# Patient Record
Sex: MALE | Race: WHITE | NOT HISPANIC OR LATINO | Employment: OTHER | URBAN - NONMETROPOLITAN AREA
[De-identification: names, ages, dates, MRNs, and addresses within clinical notes are randomized per-mention and may not be internally consistent; named-entity substitution may affect disease eponyms.]

---

## 2017-08-29 ENCOUNTER — HOSPITAL ENCOUNTER (EMERGENCY)
Facility: HOSPITAL | Age: 37
Discharge: HOME | End: 2017-08-30
Attending: EMERGENCY MEDICINE | Admitting: EMERGENCY MEDICINE

## 2017-08-29 ENCOUNTER — APPOINTMENT (OUTPATIENT)
Dept: CT IMAGING | Facility: HOSPITAL | Age: 37
End: 2017-08-29

## 2017-08-29 DIAGNOSIS — R30.0 DYSURIA: Primary | ICD-10-CM

## 2017-08-29 LAB
ALBUMIN SERPL-MCNC: 4.4 G/DL (ref 3.5–5)
ALBUMIN/GLOB SERPL: 1.4 G/DL (ref 1.5–2.5)
ALP SERPL-CCNC: 82 U/L (ref 40–129)
ALT SERPL W P-5'-P-CCNC: 22 U/L (ref 10–44)
ANION GAP SERPL CALCULATED.3IONS-SCNC: 6 MMOL/L (ref 3.6–11.2)
AST SERPL-CCNC: 20 U/L (ref 10–34)
BASOPHILS # BLD AUTO: 0.05 10*3/MM3 (ref 0–0.3)
BASOPHILS NFR BLD AUTO: 0.5 % (ref 0–2)
BILIRUB SERPL-MCNC: 0.3 MG/DL (ref 0.2–1.8)
BILIRUB UR QL STRIP: NEGATIVE
BUN BLD-MCNC: 8 MG/DL (ref 7–21)
BUN/CREAT SERPL: 10.1 (ref 7–25)
C TRACH RRNA CVX QL NAA+PROBE: NOT DETECTED
CALCIUM SPEC-SCNC: 9.7 MG/DL (ref 7.7–10)
CHLORIDE SERPL-SCNC: 108 MMOL/L (ref 99–112)
CK SERPL-CCNC: 105 U/L (ref 24–204)
CLARITY UR: CLEAR
CO2 SERPL-SCNC: 27 MMOL/L (ref 24.3–31.9)
COLOR UR: ABNORMAL
CREAT BLD-MCNC: 0.79 MG/DL (ref 0.43–1.29)
D-LACTATE SERPL-SCNC: 1.2 MMOL/L (ref 0.5–2)
DEPRECATED RDW RBC AUTO: 39.6 FL (ref 37–54)
EOSINOPHIL # BLD AUTO: 0.22 10*3/MM3 (ref 0–0.7)
EOSINOPHIL NFR BLD AUTO: 2.2 % (ref 0–5)
ERYTHROCYTE [DISTWIDTH] IN BLOOD BY AUTOMATED COUNT: 12 % (ref 11.5–14.5)
GFR SERPL CREATININE-BSD FRML MDRD: 110 ML/MIN/1.73
GLOBULIN UR ELPH-MCNC: 3.2 GM/DL
GLUCOSE BLD-MCNC: 111 MG/DL (ref 70–110)
GLUCOSE UR STRIP-MCNC: NEGATIVE MG/DL
HCT VFR BLD AUTO: 41 % (ref 42–52)
HGB BLD-MCNC: 14.5 G/DL (ref 14–18)
HGB UR QL STRIP.AUTO: NEGATIVE
IMM GRANULOCYTES # BLD: 0.03 10*3/MM3 (ref 0–0.03)
IMM GRANULOCYTES NFR BLD: 0.3 % (ref 0–0.5)
KETONES UR QL STRIP: NEGATIVE
LEUKOCYTE ESTERASE UR QL STRIP.AUTO: NEGATIVE
LYMPHOCYTES # BLD AUTO: 2.86 10*3/MM3 (ref 1–3)
LYMPHOCYTES NFR BLD AUTO: 28.3 % (ref 21–51)
MAGNESIUM SERPL-MCNC: 1.8 MG/DL (ref 1.7–2.6)
MCH RBC QN AUTO: 32.4 PG (ref 27–33)
MCHC RBC AUTO-ENTMCNC: 35.4 G/DL (ref 33–37)
MCV RBC AUTO: 91.7 FL (ref 80–94)
MONOCYTES # BLD AUTO: 0.47 10*3/MM3 (ref 0.1–0.9)
MONOCYTES NFR BLD AUTO: 4.7 % (ref 0–10)
N GONORRHOEA RRNA SPEC QL NAA+PROBE: NOT DETECTED
NEUTROPHILS # BLD AUTO: 6.47 10*3/MM3 (ref 1.4–6.5)
NEUTROPHILS NFR BLD AUTO: 64 % (ref 30–70)
NITRITE UR QL STRIP: NEGATIVE
OSMOLALITY SERPL CALC.SUM OF ELEC: 280.3 MOSM/KG (ref 273–305)
PH UR STRIP.AUTO: 6.5 [PH] (ref 5–8)
PLATELET # BLD AUTO: 209 10*3/MM3 (ref 130–400)
PMV BLD AUTO: 11.1 FL (ref 6–10)
POTASSIUM BLD-SCNC: 3.8 MMOL/L (ref 3.5–5.3)
PROT SERPL-MCNC: 7.6 G/DL (ref 6–8)
PROT UR QL STRIP: NEGATIVE
RBC # BLD AUTO: 4.47 10*6/MM3 (ref 4.7–6.1)
SODIUM BLD-SCNC: 141 MMOL/L (ref 135–153)
SP GR UR STRIP: 1.03 (ref 1–1.03)
UROBILINOGEN UR QL STRIP: ABNORMAL
WBC NRBC COR # BLD: 10.1 10*3/MM3 (ref 4.5–12.5)

## 2017-08-29 PROCEDURE — 83605 ASSAY OF LACTIC ACID: CPT | Performed by: EMERGENCY MEDICINE

## 2017-08-29 PROCEDURE — 81003 URINALYSIS AUTO W/O SCOPE: CPT | Performed by: EMERGENCY MEDICINE

## 2017-08-29 PROCEDURE — 74176 CT ABD & PELVIS W/O CONTRAST: CPT | Performed by: RADIOLOGY

## 2017-08-29 PROCEDURE — 74176 CT ABD & PELVIS W/O CONTRAST: CPT

## 2017-08-29 PROCEDURE — 96360 HYDRATION IV INFUSION INIT: CPT

## 2017-08-29 PROCEDURE — 83735 ASSAY OF MAGNESIUM: CPT | Performed by: EMERGENCY MEDICINE

## 2017-08-29 PROCEDURE — 87491 CHLMYD TRACH DNA AMP PROBE: CPT | Performed by: EMERGENCY MEDICINE

## 2017-08-29 PROCEDURE — 85025 COMPLETE CBC W/AUTO DIFF WBC: CPT | Performed by: EMERGENCY MEDICINE

## 2017-08-29 PROCEDURE — 87591 N.GONORRHOEAE DNA AMP PROB: CPT | Performed by: EMERGENCY MEDICINE

## 2017-08-29 PROCEDURE — 96361 HYDRATE IV INFUSION ADD-ON: CPT

## 2017-08-29 PROCEDURE — 99284 EMERGENCY DEPT VISIT MOD MDM: CPT

## 2017-08-29 PROCEDURE — 82550 ASSAY OF CK (CPK): CPT | Performed by: EMERGENCY MEDICINE

## 2017-08-29 PROCEDURE — 80053 COMPREHEN METABOLIC PANEL: CPT | Performed by: EMERGENCY MEDICINE

## 2017-08-29 RX ORDER — GABAPENTIN 100 MG/1
100 CAPSULE ORAL 3 TIMES DAILY
COMMUNITY
End: 2022-08-16

## 2017-08-29 RX ORDER — SODIUM CHLORIDE 9 MG/ML
125 INJECTION, SOLUTION INTRAVENOUS CONTINUOUS
Status: DISCONTINUED | OUTPATIENT
Start: 2017-08-29 | End: 2017-08-30 | Stop reason: HOSPADM

## 2017-08-29 RX ORDER — ATORVASTATIN CALCIUM 10 MG/1
10 TABLET, FILM COATED ORAL DAILY
COMMUNITY
End: 2022-08-16

## 2017-08-29 RX ORDER — OMEPRAZOLE 20 MG/1
20 CAPSULE, DELAYED RELEASE ORAL DAILY
COMMUNITY
End: 2022-08-16

## 2017-08-29 RX ORDER — SODIUM CHLORIDE 0.9 % (FLUSH) 0.9 %
10 SYRINGE (ML) INJECTION AS NEEDED
Status: DISCONTINUED | OUTPATIENT
Start: 2017-08-29 | End: 2017-08-30 | Stop reason: HOSPADM

## 2017-08-29 RX ADMIN — SODIUM CHLORIDE 125 ML/HR: 9 INJECTION, SOLUTION INTRAVENOUS at 22:56

## 2017-08-29 RX ADMIN — SODIUM CHLORIDE 1000 ML: 9 INJECTION, SOLUTION INTRAVENOUS at 21:38

## 2017-08-30 VITALS
RESPIRATION RATE: 16 BRPM | HEIGHT: 71 IN | SYSTOLIC BLOOD PRESSURE: 129 MMHG | OXYGEN SATURATION: 98 % | WEIGHT: 220 LBS | BODY MASS INDEX: 30.8 KG/M2 | HEART RATE: 61 BPM | TEMPERATURE: 97.9 F | DIASTOLIC BLOOD PRESSURE: 74 MMHG

## 2017-08-30 RX ORDER — PHENAZOPYRIDINE HYDROCHLORIDE 200 MG/1
200 TABLET, FILM COATED ORAL 3 TIMES DAILY PRN
Qty: 9 TABLET | Refills: 0 | Status: SHIPPED | OUTPATIENT
Start: 2017-08-30 | End: 2017-09-01

## 2018-09-23 ENCOUNTER — APPOINTMENT (OUTPATIENT)
Dept: CT IMAGING | Facility: HOSPITAL | Age: 38
End: 2018-09-23

## 2018-09-23 ENCOUNTER — HOSPITAL ENCOUNTER (EMERGENCY)
Facility: HOSPITAL | Age: 38
Discharge: HOME | End: 2018-09-23
Attending: EMERGENCY MEDICINE | Admitting: EMERGENCY MEDICINE

## 2018-09-23 VITALS
DIASTOLIC BLOOD PRESSURE: 86 MMHG | BODY MASS INDEX: 30.8 KG/M2 | RESPIRATION RATE: 17 BRPM | TEMPERATURE: 98.4 F | WEIGHT: 220 LBS | OXYGEN SATURATION: 97 % | SYSTOLIC BLOOD PRESSURE: 121 MMHG | HEART RATE: 79 BPM | HEIGHT: 71 IN

## 2018-09-23 DIAGNOSIS — R91.8 PULMONARY NODULES: ICD-10-CM

## 2018-09-23 DIAGNOSIS — S20.212A CONTUSION OF LEFT CHEST WALL, INITIAL ENCOUNTER: ICD-10-CM

## 2018-09-23 DIAGNOSIS — S39.012A STRAIN OF LUMBAR REGION, INITIAL ENCOUNTER: Primary | ICD-10-CM

## 2018-09-23 PROCEDURE — 74177 CT ABD & PELVIS W/CONTRAST: CPT

## 2018-09-23 PROCEDURE — 70450 CT HEAD/BRAIN W/O DYE: CPT | Performed by: RADIOLOGY

## 2018-09-23 PROCEDURE — 72128 CT CHEST SPINE W/O DYE: CPT

## 2018-09-23 PROCEDURE — 99282 EMERGENCY DEPT VISIT SF MDM: CPT

## 2018-09-23 PROCEDURE — 72128 CT CHEST SPINE W/O DYE: CPT | Performed by: RADIOLOGY

## 2018-09-23 PROCEDURE — 71260 CT THORAX DX C+: CPT | Performed by: RADIOLOGY

## 2018-09-23 PROCEDURE — 93010 ELECTROCARDIOGRAM REPORT: CPT | Performed by: INTERNAL MEDICINE

## 2018-09-23 PROCEDURE — 93005 ELECTROCARDIOGRAM TRACING: CPT | Performed by: FAMILY MEDICINE

## 2018-09-23 PROCEDURE — 25010000002 IOPAMIDOL 61 % SOLUTION: Performed by: EMERGENCY MEDICINE

## 2018-09-23 PROCEDURE — 70450 CT HEAD/BRAIN W/O DYE: CPT

## 2018-09-23 PROCEDURE — 72125 CT NECK SPINE W/O DYE: CPT | Performed by: RADIOLOGY

## 2018-09-23 PROCEDURE — 72131 CT LUMBAR SPINE W/O DYE: CPT

## 2018-09-23 PROCEDURE — 74177 CT ABD & PELVIS W/CONTRAST: CPT | Performed by: RADIOLOGY

## 2018-09-23 PROCEDURE — 96360 HYDRATION IV INFUSION INIT: CPT

## 2018-09-23 PROCEDURE — 71260 CT THORAX DX C+: CPT

## 2018-09-23 PROCEDURE — 72131 CT LUMBAR SPINE W/O DYE: CPT | Performed by: RADIOLOGY

## 2018-09-23 PROCEDURE — 72125 CT NECK SPINE W/O DYE: CPT

## 2018-09-23 RX ORDER — CYCLOBENZAPRINE HCL 10 MG
10 TABLET ORAL 3 TIMES DAILY PRN
Qty: 15 TABLET | Refills: 0 | Status: SHIPPED | OUTPATIENT
Start: 2018-09-23 | End: 2022-08-16

## 2018-09-23 RX ORDER — HYDROXYZINE PAMOATE 25 MG/1
25 CAPSULE ORAL NIGHTLY
COMMUNITY
End: 2022-08-16

## 2018-09-23 RX ORDER — HYDROCODONE BITARTRATE AND ACETAMINOPHEN 7.5; 325 MG/1; MG/1
1 TABLET ORAL EVERY 6 HOURS PRN
COMMUNITY

## 2018-09-23 RX ORDER — SODIUM CHLORIDE 0.9 % (FLUSH) 0.9 %
10 SYRINGE (ML) INJECTION AS NEEDED
Status: DISCONTINUED | OUTPATIENT
Start: 2018-09-23 | End: 2018-09-23 | Stop reason: HOSPADM

## 2018-09-23 RX ADMIN — IOPAMIDOL 92 ML: 612 INJECTION, SOLUTION INTRAVENOUS at 17:03

## 2018-09-23 RX ADMIN — SODIUM CHLORIDE 1000 ML: 9 INJECTION, SOLUTION INTRAVENOUS at 16:34
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
## 2021-04-05 ENCOUNTER — TRANSCRIBE ORDERS (OUTPATIENT)
Dept: ADMINISTRATIVE | Facility: HOSPITAL | Age: 41
End: 2021-04-05

## 2021-04-05 ENCOUNTER — HOSPITAL ENCOUNTER (OUTPATIENT)
Dept: GENERAL RADIOLOGY | Facility: HOSPITAL | Age: 41
Discharge: HOME | End: 2021-04-05
Admitting: PHYSICIAN ASSISTANT

## 2021-04-05 DIAGNOSIS — M25.511 ACUTE PAIN OF RIGHT SHOULDER: Primary | ICD-10-CM

## 2021-04-05 DIAGNOSIS — M25.511 ACUTE PAIN OF RIGHT SHOULDER: ICD-10-CM

## 2021-04-05 PROCEDURE — 73030 X-RAY EXAM OF SHOULDER: CPT

## 2021-04-05 PROCEDURE — 73030 X-RAY EXAM OF SHOULDER: CPT | Performed by: RADIOLOGY

## 2022-04-04 ENCOUNTER — APPOINTMENT (OUTPATIENT)
Dept: GENERAL RADIOLOGY | Facility: HOSPITAL | Age: 42
End: 2022-04-04

## 2022-04-04 LAB
ALBUMIN SERPL-MCNC: 3.99 G/DL (ref 3.5–5.2)
ALBUMIN/GLOB SERPL: 1.3 G/DL
ALP SERPL-CCNC: 94 U/L (ref 39–117)
ALT SERPL W P-5'-P-CCNC: 18 U/L (ref 1–41)
ANION GAP SERPL CALCULATED.3IONS-SCNC: 9.7 MMOL/L (ref 5–15)
AST SERPL-CCNC: 16 U/L (ref 1–40)
BASOPHILS # BLD AUTO: 0.08 10*3/MM3 (ref 0–0.2)
BASOPHILS NFR BLD AUTO: 0.8 % (ref 0–1.5)
BILIRUB SERPL-MCNC: 0.2 MG/DL (ref 0–1.2)
BUN SERPL-MCNC: 6 MG/DL (ref 6–20)
BUN/CREAT SERPL: 6.5 (ref 7–25)
CALCIUM SPEC-SCNC: 9 MG/DL (ref 8.6–10.5)
CHLORIDE SERPL-SCNC: 101 MMOL/L (ref 98–107)
CO2 SERPL-SCNC: 23.3 MMOL/L (ref 22–29)
CREAT SERPL-MCNC: 0.92 MG/DL (ref 0.76–1.27)
DEPRECATED RDW RBC AUTO: 40 FL (ref 37–54)
EGFRCR SERPLBLD CKD-EPI 2021: 107.2 ML/MIN/1.73
EOSINOPHIL # BLD AUTO: 0.2 10*3/MM3 (ref 0–0.4)
EOSINOPHIL NFR BLD AUTO: 2 % (ref 0.3–6.2)
ERYTHROCYTE [DISTWIDTH] IN BLOOD BY AUTOMATED COUNT: 11.8 % (ref 12.3–15.4)
GLOBULIN UR ELPH-MCNC: 3 GM/DL
GLUCOSE SERPL-MCNC: 133 MG/DL (ref 65–99)
HCT VFR BLD AUTO: 41.6 % (ref 37.5–51)
HGB BLD-MCNC: 14.6 G/DL (ref 13–17.7)
HOLD SPECIMEN: NORMAL
HOLD SPECIMEN: NORMAL
IMM GRANULOCYTES # BLD AUTO: 0.03 10*3/MM3 (ref 0–0.05)
IMM GRANULOCYTES NFR BLD AUTO: 0.3 % (ref 0–0.5)
INR PPP: 0.95 (ref 0.9–1.1)
LYMPHOCYTES # BLD AUTO: 3.03 10*3/MM3 (ref 0.7–3.1)
LYMPHOCYTES NFR BLD AUTO: 29.6 % (ref 19.6–45.3)
MAGNESIUM SERPL-MCNC: 1.7 MG/DL (ref 1.6–2.6)
MCH RBC QN AUTO: 32.2 PG (ref 26.6–33)
MCHC RBC AUTO-ENTMCNC: 35.1 G/DL (ref 31.5–35.7)
MCV RBC AUTO: 91.8 FL (ref 79–97)
MONOCYTES # BLD AUTO: 0.45 10*3/MM3 (ref 0.1–0.9)
MONOCYTES NFR BLD AUTO: 4.4 % (ref 5–12)
NEUTROPHILS NFR BLD AUTO: 6.44 10*3/MM3 (ref 1.7–7)
NEUTROPHILS NFR BLD AUTO: 62.9 % (ref 42.7–76)
NRBC BLD AUTO-RTO: 0 /100 WBC (ref 0–0.2)
PLATELET # BLD AUTO: 212 10*3/MM3 (ref 140–450)
PMV BLD AUTO: 10.3 FL (ref 6–12)
POTASSIUM SERPL-SCNC: 3.8 MMOL/L (ref 3.5–5.2)
PROT SERPL-MCNC: 7 G/DL (ref 6–8.5)
PROTHROMBIN TIME: 12.8 SECONDS (ref 12.1–14.7)
RBC # BLD AUTO: 4.53 10*6/MM3 (ref 4.14–5.8)
SODIUM SERPL-SCNC: 134 MMOL/L (ref 136–145)
TROPONIN T SERPL-MCNC: <0.01 NG/ML (ref 0–0.03)
TSH SERPL DL<=0.05 MIU/L-ACNC: 2.61 UIU/ML (ref 0.27–4.2)
WBC NRBC COR # BLD: 10.23 10*3/MM3 (ref 3.4–10.8)
WHOLE BLOOD HOLD SPECIMEN: NORMAL
WHOLE BLOOD HOLD SPECIMEN: NORMAL

## 2022-04-04 PROCEDURE — 85025 COMPLETE CBC W/AUTO DIFF WBC: CPT | Performed by: PHYSICIAN ASSISTANT

## 2022-04-04 PROCEDURE — 36415 COLL VENOUS BLD VENIPUNCTURE: CPT

## 2022-04-04 PROCEDURE — 84484 ASSAY OF TROPONIN QUANT: CPT | Performed by: PHYSICIAN ASSISTANT

## 2022-04-04 PROCEDURE — 93005 ELECTROCARDIOGRAM TRACING: CPT | Performed by: STUDENT IN AN ORGANIZED HEALTH CARE EDUCATION/TRAINING PROGRAM

## 2022-04-04 PROCEDURE — 93005 ELECTROCARDIOGRAM TRACING: CPT | Performed by: EMERGENCY MEDICINE

## 2022-04-04 PROCEDURE — 71045 X-RAY EXAM CHEST 1 VIEW: CPT

## 2022-04-04 PROCEDURE — 93010 ELECTROCARDIOGRAM REPORT: CPT | Performed by: INTERNAL MEDICINE

## 2022-04-04 PROCEDURE — 93005 ELECTROCARDIOGRAM TRACING: CPT | Performed by: PHYSICIAN ASSISTANT

## 2022-04-04 PROCEDURE — 85610 PROTHROMBIN TIME: CPT | Performed by: PHYSICIAN ASSISTANT

## 2022-04-04 PROCEDURE — 83735 ASSAY OF MAGNESIUM: CPT | Performed by: PHYSICIAN ASSISTANT

## 2022-04-04 PROCEDURE — 84443 ASSAY THYROID STIM HORMONE: CPT | Performed by: PHYSICIAN ASSISTANT

## 2022-04-04 PROCEDURE — 80053 COMPREHEN METABOLIC PANEL: CPT | Performed by: PHYSICIAN ASSISTANT

## 2022-04-04 PROCEDURE — 99284 EMERGENCY DEPT VISIT MOD MDM: CPT

## 2022-04-04 RX ORDER — ASPIRIN 81 MG/1
324 TABLET, CHEWABLE ORAL ONCE
Status: COMPLETED | OUTPATIENT
Start: 2022-04-04 | End: 2022-04-04

## 2022-04-04 RX ADMIN — ASPIRIN 324 MG: 81 TABLET, CHEWABLE ORAL at 21:51

## 2022-04-05 ENCOUNTER — TRANSCRIBE ORDERS (OUTPATIENT)
Dept: ADMINISTRATIVE | Facility: HOSPITAL | Age: 42
End: 2022-04-05

## 2022-04-05 ENCOUNTER — HOSPITAL ENCOUNTER (EMERGENCY)
Facility: HOSPITAL | Age: 42
Discharge: HOME | End: 2022-04-05
Attending: STUDENT IN AN ORGANIZED HEALTH CARE EDUCATION/TRAINING PROGRAM | Admitting: STUDENT IN AN ORGANIZED HEALTH CARE EDUCATION/TRAINING PROGRAM

## 2022-04-05 ENCOUNTER — APPOINTMENT (OUTPATIENT)
Dept: CT IMAGING | Facility: HOSPITAL | Age: 42
End: 2022-04-05

## 2022-04-05 VITALS
DIASTOLIC BLOOD PRESSURE: 66 MMHG | WEIGHT: 230 LBS | OXYGEN SATURATION: 91 % | HEART RATE: 58 BPM | RESPIRATION RATE: 18 BRPM | SYSTOLIC BLOOD PRESSURE: 108 MMHG | HEIGHT: 71 IN | BODY MASS INDEX: 32.2 KG/M2 | TEMPERATURE: 98.3 F

## 2022-04-05 DIAGNOSIS — R55 SYNCOPE AND COLLAPSE: ICD-10-CM

## 2022-04-05 DIAGNOSIS — R07.89 MID STERNAL CHEST PAIN: Primary | ICD-10-CM

## 2022-04-05 DIAGNOSIS — I20.8 ANGINA AT REST: Primary | ICD-10-CM

## 2022-04-05 LAB
QT INTERVAL: 396 MS
QTC INTERVAL: 427 MS
TROPONIN T SERPL-MCNC: <0.01 NG/ML (ref 0–0.03)

## 2022-04-05 PROCEDURE — 84484 ASSAY OF TROPONIN QUANT: CPT | Performed by: PHYSICIAN ASSISTANT

## 2022-04-05 PROCEDURE — 70450 CT HEAD/BRAIN W/O DYE: CPT

## 2022-04-05 RX ORDER — SODIUM CHLORIDE 0.9 % (FLUSH) 0.9 %
10 SYRINGE (ML) INJECTION AS NEEDED
Status: DISCONTINUED | OUTPATIENT
Start: 2022-04-05 | End: 2022-04-05 | Stop reason: HOSPADM

## 2022-04-05 RX ORDER — NITROGLYCERIN 0.4 MG/1
0.4 TABLET SUBLINGUAL
Qty: 45 TABLET | Refills: 0 | Status: SHIPPED | OUTPATIENT
Start: 2022-04-05

## 2022-04-05 RX ADMIN — SODIUM CHLORIDE 500 ML: 9 INJECTION, SOLUTION INTRAVENOUS at 00:50

## 2022-04-05 RX ADMIN — NITROGLYCERIN 0.5 INCH: 20 OINTMENT TOPICAL at 00:50

## 2022-04-05 NOTE — ED PROVIDER NOTES
Subjective     History provided by:  Patient  Chest Pain  Pain location:  Substernal area  Pain quality: aching and throbbing    Pain radiates to:  Does not radiate  Pain severity:  Moderate  Onset quality:  Sudden  Duration:  2 hours  Timing:  Intermittent  Progression:  Waxing and waning  Chronicity:  New  Context: eating (started after eating) and at rest    Relieved by:  Nothing  Associated symptoms: syncope    Associated symptoms: no abdominal pain and no fever    Risk factors: obesity and smoking        Review of Systems   Constitutional: Negative.  Negative for fever.   HENT: Negative.    Respiratory: Negative.    Cardiovascular: Positive for chest pain and syncope.   Gastrointestinal: Negative.  Negative for abdominal pain.   Endocrine: Negative.    Genitourinary: Negative.  Negative for dysuria.   Skin: Negative.    Neurological: Positive for syncope.   Psychiatric/Behavioral: Negative.    All other systems reviewed and are negative.      Past Medical History:   Diagnosis Date   • Anxiety    • Hyperlipidemia    • Neuropathy        Allergies   Allergen Reactions   • Ativan [Lorazepam]    • Penicillins        Past Surgical History:   Procedure Laterality Date   • APPENDECTOMY     • CHOLECYSTECTOMY         No family history on file.    Social History     Socioeconomic History   • Marital status:    Tobacco Use   • Smoking status: Current Every Day Smoker     Packs/day: 2.00   Substance and Sexual Activity   • Alcohol use: No   • Drug use: No   • Sexual activity: Defer           Objective   Physical Exam  Vitals and nursing note reviewed.   Constitutional:       General: He is not in acute distress.     Appearance: He is well-developed. He is not diaphoretic.   HENT:      Head: Normocephalic and atraumatic.      Right Ear: External ear normal.      Left Ear: External ear normal.      Nose: Nose normal.   Eyes:      Conjunctiva/sclera: Conjunctivae normal.   Neck:      Vascular: No JVD.      Trachea: No  tracheal deviation.   Cardiovascular:      Rate and Rhythm: Normal rate and regular rhythm.      Heart sounds: Normal heart sounds. No murmur heard.     Comments: Reproducible mid sternal chest pain   Pulmonary:      Effort: Pulmonary effort is normal. No respiratory distress.      Breath sounds: Normal breath sounds. No wheezing.   Abdominal:      General: Bowel sounds are normal.      Palpations: Abdomen is soft.      Tenderness: There is no abdominal tenderness.   Musculoskeletal:         General: No deformity. Normal range of motion.      Cervical back: Normal range of motion and neck supple.   Skin:     General: Skin is warm and dry.      Coloration: Skin is not pale.      Findings: No erythema or rash.   Neurological:      Mental Status: He is alert and oriented to person, place, and time.      Cranial Nerves: No cranial nerve deficit.   Psychiatric:         Behavior: Behavior normal.         Thought Content: Thought content normal.         Procedures           ED Course  ED Course as of 04/05/22 0206   Mon Apr 04, 2022 2327 ECG 12 Lead  Vent. rate 70 BPM  NE interval 150 ms  QRS duration 84 ms  QT/QTcB 396/427 ms  P-R-T axes 19 56 40  Normal sinus rhythm  Cannot rule out Anterior infarct , age undetermined  Abnormal ECG  When compared with ECG of 23-SEP-2018 16:01,  No significant change was found [ES]   e Apr 05, 2022   0049 CXR rad interpreted:  Findings suggestive of mild CHF/volume overload [RB]   0145 CT head rad interpreted:  Old posttraumatic changes in the head and face. No acute findings identified. [RB]   0204 Outpatient stress test ordered.  [RB]      ED Course User Index  [ES] Branden Rodrigues MD  [RB] Alex Martinez II, PA                                               HEART Score (for prediction of 6-week risk of major adverse cardiac event) reviewed and/or performed as part of the patient evaluation and treatment planning process.  The result associated with this review/performance  is: 2       MDM  Number of Diagnoses or Management Options  Mid sternal chest pain: new and requires workup  Syncope and collapse: new and requires workup     Amount and/or Complexity of Data Reviewed  Clinical lab tests: reviewed and ordered  Tests in the radiology section of CPT®: ordered and reviewed  Tests in the medicine section of CPT®: reviewed  Discuss the patient with other providers: yes    Risk of Complications, Morbidity, and/or Mortality  Presenting problems: moderate  Diagnostic procedures: moderate  Management options: low    Patient Progress  Patient progress: stable      Final diagnoses:   Mid sternal chest pain   Syncope and collapse       ED Disposition  ED Disposition     ED Disposition   Discharge    Condition   Stable    Comment   --             Doanld Awad MD  [address] [facility] [address]  [phone]    Schedule an appointment as soon as possible for a visit       Donnell Barnes MD  [address] [facility] [address]  [phone]    Schedule an appointment as soon as possible for a visit            Medication List      New Prescriptions    nitroglycerin 0.4 MG SL tablet  Commonly known as: NITROSTAT  Place 1 tablet under the tongue Every 5 (Five) Minutes As Needed for Chest Pain. Take no more than 3 doses in 15 minutes.           Where to Get Your Medications      You can get these medications from any pharmacy    Bring a paper prescription for each of these medications  · nitroglycerin 0.4 MG SL tablet          Alex Martinez II, PA  04/05/22 0201

## 2022-04-05 NOTE — ED NOTES
MEDICAL SCREENING:    Reason for Visit: chest pain    Patient initially seen in triage.  The patient was advised further evaluation and diagnostic testing will be needed, some of the treatment and testing will be initiated in the lobby in order to begin the process.  The patient will be returned to the waiting area for the time being and possibly be re-assessed by a subsequent ED provider.  The patient will be brought back to the treatment area in as timely manner as possible.         Tanisha Hartmann PA-C  04/04/22 214

## 2022-04-07 ENCOUNTER — TRANSCRIBE ORDERS (OUTPATIENT)
Dept: ADMINISTRATIVE | Facility: HOSPITAL | Age: 42
End: 2022-04-07

## 2022-04-07 DIAGNOSIS — R07.2 PRECORDIAL PAIN: Primary | ICD-10-CM

## 2022-05-03 ENCOUNTER — HOSPITAL ENCOUNTER (OUTPATIENT)
Dept: NUCLEAR MEDICINE | Facility: HOSPITAL | Age: 42
Discharge: HOME | End: 2022-05-03

## 2022-05-03 ENCOUNTER — HOSPITAL ENCOUNTER (OUTPATIENT)
Dept: CARDIOLOGY | Facility: HOSPITAL | Age: 42
Discharge: HOME | End: 2022-05-03

## 2022-05-03 DIAGNOSIS — I20.8 ANGINA AT REST: ICD-10-CM

## 2022-05-03 DIAGNOSIS — R07.2 PRECORDIAL PAIN: ICD-10-CM

## 2022-05-03 LAB
BH CV ECHO MEAS - AO ROOT DIAM: 3.2 CM
BH CV ECHO MEAS - EDV(CUBED): 74.1 ML
BH CV ECHO MEAS - EDV(MOD-SP4): 64.8 ML
BH CV ECHO MEAS - EF(MOD-SP4): 61.7 %
BH CV ECHO MEAS - ESV(CUBED): 32.8 ML
BH CV ECHO MEAS - ESV(MOD-SP4): 24.8 ML
BH CV ECHO MEAS - FS: 23.8 %
BH CV ECHO MEAS - IVS/LVPW: 1.07 CM
BH CV ECHO MEAS - IVSD: 1.6 CM
BH CV ECHO MEAS - LA DIMENSION: 3.3 CM
BH CV ECHO MEAS - LAT PEAK E' VEL: 13.3 CM/SEC
BH CV ECHO MEAS - LV DIASTOLIC VOL/BSA (35-75): 29 CM2
BH CV ECHO MEAS - LV MASS(C)D: 262.6 GRAMS
BH CV ECHO MEAS - LV SYSTOLIC VOL/BSA (12-30): 11.1 CM2
BH CV ECHO MEAS - LVIDD: 4.2 CM
BH CV ECHO MEAS - LVIDS: 3.2 CM
BH CV ECHO MEAS - LVOT AREA: 5.3 CM2
BH CV ECHO MEAS - LVOT DIAM: 2.6 CM
BH CV ECHO MEAS - LVPWD: 1.5 CM
BH CV ECHO MEAS - MED PEAK E' VEL: 10.1 CM/SEC
BH CV ECHO MEAS - MV A MAX VEL: 56.1 CM/SEC
BH CV ECHO MEAS - MV E MAX VEL: 106 CM/SEC
BH CV ECHO MEAS - MV E/A: 1.89
BH CV ECHO MEAS - PA ACC TIME: 0.12 SEC
BH CV ECHO MEAS - PA PR(ACCEL): 26.8 MMHG
BH CV ECHO MEAS - RAP SYSTOLE: 10 MMHG
BH CV ECHO MEAS - RVSP: 34.2 MMHG
BH CV ECHO MEAS - SI(MOD-SP4): 17.9 ML/M2
BH CV ECHO MEAS - SV(MOD-SP4): 40 ML
BH CV ECHO MEAS - TAPSE (>1.6): 3 CM
BH CV ECHO MEAS - TR MAX PG: 24.2 MMHG
BH CV ECHO MEAS - TR MAX VEL: 246 CM/SEC
BH CV ECHO MEASUREMENTS AVERAGE E/E' RATIO: 9.06
LEFT ATRIUM VOLUME INDEX: 19.2 ML/M2
MAXIMAL PREDICTED HEART RATE: 179 BPM
STRESS TARGET HR: 152 BPM

## 2022-05-03 PROCEDURE — A9500 TC99M SESTAMIBI: HCPCS | Performed by: STUDENT IN AN ORGANIZED HEALTH CARE EDUCATION/TRAINING PROGRAM

## 2022-05-03 PROCEDURE — 93018 CV STRESS TEST I&R ONLY: CPT | Performed by: INTERNAL MEDICINE

## 2022-05-03 PROCEDURE — 93306 TTE W/DOPPLER COMPLETE: CPT | Performed by: SPECIALIST

## 2022-05-03 PROCEDURE — 0 TECHNETIUM SESTAMIBI: Performed by: STUDENT IN AN ORGANIZED HEALTH CARE EDUCATION/TRAINING PROGRAM

## 2022-05-03 PROCEDURE — 78452 HT MUSCLE IMAGE SPECT MULT: CPT | Performed by: INTERNAL MEDICINE

## 2022-05-03 PROCEDURE — 93017 CV STRESS TEST TRACING ONLY: CPT

## 2022-05-03 PROCEDURE — 93306 TTE W/DOPPLER COMPLETE: CPT

## 2022-05-03 PROCEDURE — 78452 HT MUSCLE IMAGE SPECT MULT: CPT

## 2022-05-03 RX ADMIN — TECHNETIUM TC 99M SESTAMIBI 1 DOSE: 1 INJECTION INTRAVENOUS at 10:45

## 2022-05-04 PROCEDURE — 25010000002 REGADENOSON 0.4 MG/5ML SOLUTION: Performed by: STUDENT IN AN ORGANIZED HEALTH CARE EDUCATION/TRAINING PROGRAM

## 2022-05-04 PROCEDURE — 0 TECHNETIUM SESTAMIBI: Performed by: STUDENT IN AN ORGANIZED HEALTH CARE EDUCATION/TRAINING PROGRAM

## 2022-05-04 PROCEDURE — A9500 TC99M SESTAMIBI: HCPCS | Performed by: STUDENT IN AN ORGANIZED HEALTH CARE EDUCATION/TRAINING PROGRAM

## 2022-05-04 RX ADMIN — TECHNETIUM TC 99M SESTAMIBI 1 DOSE: 1 INJECTION INTRAVENOUS at 08:55

## 2022-05-04 RX ADMIN — REGADENOSON 0.4 MG: 0.08 INJECTION, SOLUTION INTRAVENOUS at 08:55

## 2022-05-09 LAB
BH CV NUCLEAR PRIOR STUDY: 3
BH CV REST NUCLEAR ISOTOPE DOSE: 9.6 MCI
BH CV STRESS BP STAGE 1: NORMAL
BH CV STRESS BP STAGE 2: NORMAL
BH CV STRESS COMMENTS STAGE 1: NORMAL
BH CV STRESS COMMENTS STAGE 2: NORMAL
BH CV STRESS DOSE REGADENOSON STAGE 1: 0.4
BH CV STRESS DURATION MIN STAGE 1: 0
BH CV STRESS DURATION MIN STAGE 2: 4
BH CV STRESS DURATION SEC STAGE 1: 10
BH CV STRESS DURATION SEC STAGE 2: 0
BH CV STRESS HR STAGE 1: 82
BH CV STRESS HR STAGE 2: 83
BH CV STRESS NUCLEAR ISOTOPE DOSE: 32.1 MCI
BH CV STRESS PROTOCOL 1: NORMAL
BH CV STRESS RECOVERY BP: NORMAL MMHG
BH CV STRESS RECOVERY HR: 83 BPM
BH CV STRESS STAGE 1: 1
BH CV STRESS STAGE 2: 2
LV EF NUC BP: 64 %
MAXIMAL PREDICTED HEART RATE: 179 BPM
PERCENT MAX PREDICTED HR: 46.37 %
STRESS BASELINE BP: NORMAL MMHG
STRESS BASELINE HR: 70 BPM
STRESS PERCENT HR: 55 %
STRESS POST PEAK BP: NORMAL MMHG
STRESS POST PEAK HR: 83 BPM
STRESS TARGET HR: 152 BPM

## 2022-06-13 PROBLEM — R07.9 CHEST PAIN: Status: ACTIVE | Noted: 2022-06-13

## 2022-08-09 PROBLEM — Z72.0 TOBACCO USE: Status: ACTIVE | Noted: 2022-08-09

## 2022-08-11 NOTE — PROGRESS NOTES
[facility] at [facility]  Cardiology Consultation Note     Shoaib Eng  1980  Requesting Provider: Donald Awad MD  PCP: Donald Awad MD    ID:  Shoaib Eng is a 42 y.o. male who resides in [place], [place]    REASON FOR CONSULTATION:    • Chest pain  • Syncope         Dear Dr Awad:    Thank you for sending Shoaib Eng to my office today for evaluation of chest pain.  He is a 42-year-old female with no known cardiac disease who in April had a syncopal episode while defecating.  When he awoke, he had severe chest pain symptoms.  He presented to [facility] emergency room where he ruled out for myocardial infarction.  CT chest showed no pulmonary embolism.  He was discharged home with orders for a stress test.  A nuclear stress test was then performed for 5/9/2022, which was normal.    The patient continues to have episodes of substernal chest discomfort which she describes as pressure with radiation to his arm.  The symptoms occur sporadically, with no provoking factors such as exertion.  The symptoms typically last 15 minutes and resolve spontaneously.  He is not taking sublingual nitroglycerin for these episodes.  While one episode of chest pain did occur after eating, most episodes are not associated with food.    The patient has concerns of CAD due to his family history.  His father had coronary artery disease requiring PCI in his 40s.  He has several other second-degree relatives with precocious CAD.    The patient historically is a 2 pack-a-day smoker, but is cutting down.      Past Medical History, Past Surgical History, Family history, Social History, and Medications were all reviewed with the patient today and updated as necessary.       Current Outpatient Medications:   •  ALPRAZolam (XANAX) 0.25 MG tablet, Take 1 tablet by mouth Daily., Disp: , Rfl:   •  gabapentin (NEURONTIN) 600 MG tablet, Take  by mouth 3 (Three) Times a Day., Disp: , Rfl:   •  " HYDROcodone-acetaminophen (NORCO) 7.5-325 MG per tablet, Take 1 tablet by mouth Every 6 (Six) Hours As Needed for Moderate Pain ., Disp: , Rfl:   •  nitroglycerin (NITROSTAT) 0.4 MG SL tablet, Place 1 tablet under the tongue Every 5 (Five) Minutes As Needed for Chest Pain. Take no more than 3 doses in 15 minutes., Disp: 45 tablet, Rfl: 0  •  nicotine (Nicoderm CQ) 14 MG/24HR patch, Place 1 patch on the skin as directed by provider Daily., Disp: 30 patch, Rfl: 3    Allergies   Allergen Reactions   • Hurricaine-Dry Handle [Benzocaine] Other (See Comments)     Stops breathing and turns gray   • Ativan [Lorazepam]    • Penicillins          Past Medical History:   Diagnosis Date   • Anxiety    • Family history of stress    • History of echocardiogram    • Hyperlipemia    • Hyperlipidemia    • Neuropathy        Past Surgical History:   Procedure Laterality Date   • APPENDECTOMY     • CHOLECYSTECTOMY         Family History   Problem Relation Age of Onset   • Heart disease Father    • Heart attack Father        Social History     Tobacco Use   • Smoking status: Current Every Day Smoker     Packs/day: 2.00   • Smokeless tobacco: Never Used   Substance Use Topics   • Alcohol use: No       Review of Systems   Constitutional: Positive for malaise/fatigue.   Eyes: Negative for vision loss in left eye and vision loss in right eye.   Cardiovascular: Positive for chest pain and syncope. Negative for dyspnea on exertion, near-syncope, orthopnea, palpitations and paroxysmal nocturnal dyspnea.   Musculoskeletal: Negative for myalgias.   Neurological: Negative for brief paralysis, excessive daytime sleepiness, focal weakness, numbness, paresthesias and weakness.   All other systems reviewed and are negative.              /70 (BP Location: Right arm, Patient Position: Sitting)   Pulse 68   Ht 180.3 cm (71\")   Wt 107 kg (236 lb)   SpO2 97%   BMI 32.92 kg/m²        Constitutional:       Appearance: Healthy appearance. " Well-developed.   Eyes:      General: Lids are normal. No scleral icterus.     Conjunctiva/sclera: Conjunctivae normal.   HENT:      Head: Normocephalic and atraumatic.   Neck:      Thyroid: No thyromegaly.      Vascular: No carotid bruit or JVD.   Pulmonary:      Effort: Pulmonary effort is normal.      Breath sounds: Normal breath sounds. No wheezing. No rhonchi. No rales.   Cardiovascular:      Normal rate. Regular rhythm.      Murmurs: There is no murmur.      No gallop. No rub.   Pulses:     Intact distal pulses.   Abdominal:      General: There is no distension.      Palpations: Abdomen is soft. There is no abdominal mass.   Musculoskeletal:      Cervical back: Normal range of motion. Skin:     General: Skin is warm and dry.      Findings: No rash.   Neurological:      General: No focal deficit present.      Mental Status: Alert and oriented to person, place, and time.      Gait: Gait is intact.   Psychiatric:         Attention and Perception: Attention normal.         Mood and Affect: Mood normal.         Behavior: Behavior normal.             ECG 12 Lead    Date/Time: 8/16/2022 12:48 PM  Performed by: Walter Aj IV, MD  Authorized by: Walter Aj IV, MD   Comparison: compared with previous ECG from 4/4/2022  Similar to previous ECG  Rhythm: sinus rhythm  BPM: 65    Clinical impression: normal ECG          I personally reviewed echo images dated 5/3/2022.  Normal echo.    Lab Results   Component Value Date    GLUCOSE 133 (H) 04/04/2022    BUN 6 04/04/2022    CREATININE 0.92 04/04/2022    BCR 6.5 (L) 04/04/2022    K 3.8 04/04/2022    CO2 23.3 04/04/2022    CALCIUM 9.0 04/04/2022    ALBUMIN 3.99 04/04/2022    AST 16 04/04/2022    ALT 18 04/04/2022     Lab Results   Component Value Date    WBC 10.23 04/04/2022    HGB 14.6 04/04/2022    HCT 41.6 04/04/2022    MCV 91.8 04/04/2022     04/04/2022     Lab Results   Component Value Date    TSH 2.610 04/04/2022      Lab date:  02/08/2022  • FLP: , , HDL 22,           Problem List Items Addressed This Visit        Cardiology Problems    Atypical angina (HCC) - Primary    Overview     · Echocardiogram (5/3/2022): LVEF 65%. Moderate concentric hypertrophy.  · Nuclear stress test (5/4/2022): No evidence of ischemia. LVEF 64%.          Current Assessment & Plan     · Persistent chest pain symptoms with typical and atypical features  · Patient with strong family history of precocious CAD and concerns of occult CAD  · Recommend CT coronary angiogram  · If normal, pursue noncardiac etiologies for symptoms         Relevant Orders    CT Angiogram Coronary    LDL Cholesterol, Direct    Comprehensive Metabolic Panel    Lipid Panel       Other    Situational syncope    Overview     · Episode of syncope after defecation, 4/2022  · Echo (5/3/2022): LVEF 62%.  Normal valves.         Current Assessment & Plan     · Defecation syncope, vasovagal variant  · Structurally normal heart  · Discussed pathophysiology of vasovagal syncope with patient         Tobacco use    Current Assessment & Plan     · Smoking cessation recommended         Relevant Medications    nicotine (Nicoderm CQ) 14 MG/24HR patch                   · CT coronary angiogram  · Repeat CMP, lipids  · Further recommendations to follow            SOCORRO Aj MD, FACC, [facility]  Interventional Cardiology  08/16/22  12:51 EDT

## 2022-08-16 ENCOUNTER — OFFICE VISIT (OUTPATIENT)
Dept: CARDIOLOGY | Facility: CLINIC | Age: 42
End: 2022-08-16

## 2022-08-16 VITALS
WEIGHT: 236 LBS | HEIGHT: 71 IN | HEART RATE: 68 BPM | OXYGEN SATURATION: 97 % | BODY MASS INDEX: 33.04 KG/M2 | SYSTOLIC BLOOD PRESSURE: 118 MMHG | DIASTOLIC BLOOD PRESSURE: 70 MMHG

## 2022-08-16 DIAGNOSIS — I20.8 ATYPICAL ANGINA: Primary | ICD-10-CM

## 2022-08-16 DIAGNOSIS — Z72.0 TOBACCO USE: ICD-10-CM

## 2022-08-16 DIAGNOSIS — R55 SITUATIONAL SYNCOPE: ICD-10-CM

## 2022-08-16 PROBLEM — R21 RASH: Status: ACTIVE | Noted: 2022-08-16

## 2022-08-16 PROBLEM — R05.9 COUGH: Status: ACTIVE | Noted: 2022-08-16

## 2022-08-16 PROBLEM — J06.9 UPPER RESPIRATORY INFECTION: Status: RESOLVED | Noted: 2022-08-16 | Resolved: 2022-08-16

## 2022-08-16 PROBLEM — M54.50 LOW BACK PAIN: Status: ACTIVE | Noted: 2022-08-16

## 2022-08-16 PROBLEM — J06.9 UPPER RESPIRATORY INFECTION: Status: ACTIVE | Noted: 2022-08-16

## 2022-08-16 PROBLEM — K62.5 BLEEDING PER RECTUM: Status: RESOLVED | Noted: 2022-08-16 | Resolved: 2022-08-16

## 2022-08-16 PROBLEM — R21 RASH: Status: RESOLVED | Noted: 2022-08-16 | Resolved: 2022-08-16

## 2022-08-16 PROBLEM — R05.9 COUGH: Status: RESOLVED | Noted: 2022-08-16 | Resolved: 2022-08-16

## 2022-08-16 PROBLEM — K62.5 BLEEDING PER RECTUM: Status: ACTIVE | Noted: 2022-08-16

## 2022-08-16 PROBLEM — K21.9 ACID REFLUX: Status: ACTIVE | Noted: 2022-08-16

## 2022-08-16 PROBLEM — I20.89 ATYPICAL ANGINA: Status: ACTIVE | Noted: 2022-06-13

## 2022-08-16 PROBLEM — K40.90 HERNIA, INGUINAL: Status: ACTIVE | Noted: 2022-08-16

## 2022-08-16 PROCEDURE — 99204 OFFICE O/P NEW MOD 45 MIN: CPT | Performed by: INTERNAL MEDICINE

## 2022-08-16 PROCEDURE — 93000 ELECTROCARDIOGRAM COMPLETE: CPT | Performed by: INTERNAL MEDICINE

## 2022-08-16 RX ORDER — BUTALBITAL, ACETAMINOPHEN AND CAFFEINE 300; 40; 50 MG/1; MG/1; MG/1
CAPSULE ORAL
COMMUNITY
Start: 2014-09-30 | End: 2022-08-16

## 2022-08-16 RX ORDER — LEVOTHYROXINE AND LIOTHYRONINE 19; 4.5 UG/1; UG/1
TABLET ORAL
COMMUNITY
Start: 2014-09-30 | End: 2022-08-16

## 2022-08-16 RX ORDER — BUPROPION HYDROCHLORIDE 300 MG/1
TABLET ORAL
COMMUNITY
Start: 2022-04-07 | End: 2022-08-16

## 2022-08-16 RX ORDER — OMEGA-3-ACID ETHYL ESTERS 1 G/1
CAPSULE, LIQUID FILLED ORAL
COMMUNITY
Start: 2014-09-30 | End: 2022-08-16

## 2022-08-16 RX ORDER — ALPRAZOLAM 0.25 MG/1
1 TABLET ORAL DAILY
COMMUNITY
Start: 2022-04-07

## 2022-08-16 RX ORDER — METOPROLOL SUCCINATE 25 MG/1
TABLET, EXTENDED RELEASE ORAL
COMMUNITY
Start: 2014-09-30 | End: 2022-08-16

## 2022-08-16 RX ORDER — NICOTINE 21 MG/24HR
1 PATCH, TRANSDERMAL 24 HOURS TRANSDERMAL EVERY 24 HOURS
Qty: 30 PATCH | Refills: 3 | Status: SHIPPED | OUTPATIENT
Start: 2022-08-16

## 2022-08-16 RX ORDER — GABAPENTIN 800 MG/1
TABLET ORAL
COMMUNITY
Start: 2014-09-30 | End: 2022-08-16

## 2022-08-16 RX ORDER — GEMFIBROZIL 600 MG/1
TABLET, FILM COATED ORAL
COMMUNITY
Start: 2014-09-30 | End: 2022-08-16

## 2022-08-16 RX ORDER — CYCLOBENZAPRINE HCL 5 MG
TABLET ORAL
COMMUNITY
Start: 2014-09-30 | End: 2022-08-16

## 2022-08-16 RX ORDER — NITROGLYCERIN 0.4 MG/1
TABLET SUBLINGUAL
COMMUNITY
Start: 2022-04-07 | End: 2022-08-16

## 2022-08-16 RX ORDER — GABAPENTIN 600 MG/1
TABLET ORAL 3 TIMES DAILY
COMMUNITY
Start: 2022-04-07

## 2022-08-16 NOTE — ASSESSMENT & PLAN NOTE
· Persistent chest pain symptoms with typical and atypical features  · Patient with strong family history of precocious CAD and concerns of occult CAD  · Recommend CT coronary angiogram  · If normal, pursue noncardiac etiologies for symptoms

## 2022-08-16 NOTE — ASSESSMENT & PLAN NOTE
· Defecation syncope, vasovagal variant  · Structurally normal heart  · Discussed pathophysiology of vasovagal syncope with patient

## 2022-10-07 ENCOUNTER — LAB (OUTPATIENT)
Dept: LAB | Facility: HOSPITAL | Age: 42
End: 2022-10-07

## 2022-10-07 ENCOUNTER — HOSPITAL ENCOUNTER (OUTPATIENT)
Dept: CT IMAGING | Facility: HOSPITAL | Age: 42
Discharge: HOME | End: 2022-10-07

## 2022-10-07 VITALS
RESPIRATION RATE: 16 BRPM | TEMPERATURE: 98 F | SYSTOLIC BLOOD PRESSURE: 121 MMHG | BODY MASS INDEX: 32.93 KG/M2 | DIASTOLIC BLOOD PRESSURE: 78 MMHG | HEART RATE: 67 BPM | WEIGHT: 230 LBS | HEIGHT: 70 IN | OXYGEN SATURATION: 97 %

## 2022-10-07 DIAGNOSIS — I20.8 ANGINA DECUBITUS: Primary | ICD-10-CM

## 2022-10-07 DIAGNOSIS — I20.8 ATYPICAL ANGINA: ICD-10-CM

## 2022-10-07 LAB
ALBUMIN SERPL-MCNC: 3.9 G/DL (ref 3.5–5.2)
ALBUMIN/GLOB SERPL: 1.7 G/DL
ALP SERPL-CCNC: 84 U/L (ref 39–117)
ALT SERPL W P-5'-P-CCNC: 24 U/L (ref 1–41)
ANION GAP SERPL CALCULATED.3IONS-SCNC: 10.3 MMOL/L (ref 5–15)
AST SERPL-CCNC: 16 U/L (ref 1–40)
BILIRUB SERPL-MCNC: 0.4 MG/DL (ref 0–1.2)
BUN SERPL-MCNC: 7 MG/DL (ref 6–20)
BUN/CREAT SERPL: 9.2 (ref 7–25)
CALCIUM SPEC-SCNC: 8.9 MG/DL (ref 8.6–10.5)
CHLORIDE SERPL-SCNC: 103 MMOL/L (ref 98–107)
CHOLEST SERPL-MCNC: 175 MG/DL (ref 0–200)
CO2 SERPL-SCNC: 23.7 MMOL/L (ref 22–29)
CREAT SERPL-MCNC: 0.76 MG/DL (ref 0.76–1.27)
EGFRCR SERPLBLD CKD-EPI 2021: 115.1 ML/MIN/1.73
GLOBULIN UR ELPH-MCNC: 2.3 GM/DL
GLUCOSE SERPL-MCNC: 85 MG/DL (ref 65–99)
HDLC SERPL-MCNC: 23 MG/DL (ref 40–60)
LDLC SERPL CALC-MCNC: 119 MG/DL (ref 0–100)
LDLC/HDLC SERPL: 5.01 {RATIO}
POTASSIUM SERPL-SCNC: 4.3 MMOL/L (ref 3.5–5.2)
PROT SERPL-MCNC: 6.2 G/DL (ref 6–8.5)
SODIUM SERPL-SCNC: 137 MMOL/L (ref 136–145)
TRIGL SERPL-MCNC: 184 MG/DL (ref 0–150)
VLDLC SERPL-MCNC: 33 MG/DL (ref 5–40)

## 2022-10-07 PROCEDURE — 63710000001 NITROGLYCERIN 0.4 MG SUBLINGUAL TABLET

## 2022-10-07 PROCEDURE — 0 IOPAMIDOL PER 1 ML: Performed by: INTERNAL MEDICINE

## 2022-10-07 PROCEDURE — 80053 COMPREHEN METABOLIC PANEL: CPT

## 2022-10-07 PROCEDURE — 80061 LIPID PANEL: CPT

## 2022-10-07 PROCEDURE — A9270 NON-COVERED ITEM OR SERVICE: HCPCS

## 2022-10-07 PROCEDURE — 36415 COLL VENOUS BLD VENIPUNCTURE: CPT

## 2022-10-07 PROCEDURE — 75574 CT ANGIO HRT W/3D IMAGE: CPT

## 2022-10-07 RX ORDER — NITROGLYCERIN 0.4 MG/1
0.4 TABLET SUBLINGUAL
Status: COMPLETED | OUTPATIENT
Start: 2022-10-07 | End: 2022-10-07

## 2022-10-07 RX ORDER — NITROGLYCERIN 0.4 MG/1
TABLET SUBLINGUAL
Status: COMPLETED
Start: 2022-10-07 | End: 2022-10-07

## 2022-10-07 RX ORDER — LIDOCAINE HYDROCHLORIDE 10 MG/ML
5 INJECTION, SOLUTION EPIDURAL; INFILTRATION; INTRACAUDAL; PERINEURAL AS NEEDED
Status: DISCONTINUED | OUTPATIENT
Start: 2022-10-07 | End: 2022-10-08 | Stop reason: HOSPADM

## 2022-10-07 RX ORDER — SODIUM CHLORIDE 0.9 % (FLUSH) 0.9 %
10 SYRINGE (ML) INJECTION AS NEEDED
Status: DISCONTINUED | OUTPATIENT
Start: 2022-10-07 | End: 2022-10-08 | Stop reason: HOSPADM

## 2022-10-07 RX ORDER — SODIUM CHLORIDE 0.9 % (FLUSH) 0.9 %
3 SYRINGE (ML) INJECTION EVERY 12 HOURS SCHEDULED
Status: DISCONTINUED | OUTPATIENT
Start: 2022-10-07 | End: 2022-10-08 | Stop reason: HOSPADM

## 2022-10-07 RX ORDER — METOPROLOL TARTRATE 50 MG/1
100 TABLET, FILM COATED ORAL ONCE AS NEEDED
Status: DISCONTINUED | OUTPATIENT
Start: 2022-10-07 | End: 2022-10-08 | Stop reason: HOSPADM

## 2022-10-07 RX ORDER — METOPROLOL TARTRATE 50 MG/1
50 TABLET, FILM COATED ORAL ONCE AS NEEDED
Status: DISCONTINUED | OUTPATIENT
Start: 2022-10-07 | End: 2022-10-08 | Stop reason: HOSPADM

## 2022-10-07 RX ADMIN — NITROGLYCERIN 0.4 MG: 0.4 TABLET SUBLINGUAL at 10:55

## 2022-10-07 RX ADMIN — IOPAMIDOL 65 ML: 755 INJECTION, SOLUTION INTRAVENOUS at 11:08

## 2022-10-10 PROBLEM — I25.709 CORONARY ARTERY DISEASE INVOLVING CORONARY BYPASS GRAFT OF NATIVE HEART WITH ANGINA PECTORIS (HCC): Status: ACTIVE | Noted: 2022-06-13

## 2022-10-11 ENCOUNTER — TELEPHONE (OUTPATIENT)
Dept: CARDIOLOGY | Facility: CLINIC | Age: 42
End: 2022-10-11

## 2022-10-11 DIAGNOSIS — R93.1 ABNORMAL CT SCAN OF HEART: ICD-10-CM

## 2022-10-11 DIAGNOSIS — I25.10 CAD IN NATIVE ARTERY: Primary | ICD-10-CM

## 2022-10-11 DIAGNOSIS — R07.89 CHEST PAIN, ATYPICAL: ICD-10-CM

## 2022-10-11 NOTE — TELEPHONE ENCOUNTER
"----- Message from Walter Aj IV, MD sent at 10/11/2022  9:46 AM EDT -----  Please let the patient know that his CT coronary angiogram has some abnormalities to it.  While it thinks may be \"okay\", I think we should perform a diagnostic coronary angiogram to exclude the possibility of an occluded artery.    LM for pt to return call.  "

## 2022-10-11 NOTE — TELEPHONE ENCOUNTER
----- Message from Dionisio Benavides sent at 10/10/2022  5:28 PM EDT -----  Regarding: RE: cardiac catheterization  I am out of office tomorrow, Tuesday 10/11/22 but will be back bright and EARLY Wed 10/12/22.    I have a spot saved for Mr Eng per HTR's note below.  If one of you could put this order in, I will call him first thing Wed morning and get it taken care of.      Thank you so much      Priscila  ----- Message -----  From: Walter Aj IV, MD  Sent: 10/10/2022   9:45 AM EDT  To: Michelle Valles RN, Bettina Vallejo RN, #  Subject: cardiac catheterization                          CT coronary angiogram shows a possible chronic occlusion of the left circumflex and moderate disease of the LAD.  I recommend the patient undergo cardiac catheterization.  This can be performed at [facility] on the day that on there.  I have not talked with patient yet.    SOCORRO Aj MD [facility], [facility]  Interventional and General Cardiology

## 2022-10-11 NOTE — PROGRESS NOTES
"Please let the patient know that his CT coronary angiogram has some abnormalities to it.  While it thinks may be \"okay\", I think we should perform a diagnostic coronary angiogram to exclude the possibility of an occluded artery."

## 2022-10-11 NOTE — TELEPHONE ENCOUNTER
----- Message from Walter Aj IV, MD sent at 10/10/2022  9:43 AM EDT -----  Regarding: cardiac catheterization  CT coronary angiogram shows a possible chronic occlusion of the left circumflex and moderate disease of the LAD.  I recommend the patient undergo cardiac catheterization.  This can be performed at [facility] on the day that on there.  I have not talked with patient yet.    SOCORRO Aj MD [facility], [facility]  Interventional and General Cardiology

## 2022-10-11 NOTE — TELEPHONE ENCOUNTER
Reviewed results with pt. He is agreeable to cath. Order placed. He has asked to be sure to leave a message if he doesn't answer. He does have a family  to attend Wednesday morning. He will come to either [place] or [place], whichever he can get in soonest.

## 2022-10-12 ENCOUNTER — PREP FOR SURGERY (OUTPATIENT)
Dept: OTHER | Facility: HOSPITAL | Age: 42
End: 2022-10-12

## 2022-10-12 DIAGNOSIS — I25.10 CORONARY ARTERY DISEASE: Primary | ICD-10-CM

## 2022-10-12 PROBLEM — R07.89 CHEST PAIN, ATYPICAL: Status: ACTIVE | Noted: 2022-10-12

## 2022-10-12 PROBLEM — R93.1 ABNORMAL CT SCAN OF HEART: Status: ACTIVE | Noted: 2022-10-12

## 2022-10-12 RX ORDER — ASPIRIN 81 MG/1
81 TABLET ORAL DAILY
Status: CANCELLED | OUTPATIENT
Start: 2022-10-13

## 2022-10-12 RX ORDER — SODIUM CHLORIDE 0.9 % (FLUSH) 0.9 %
10 SYRINGE (ML) INJECTION EVERY 12 HOURS SCHEDULED
Status: CANCELLED | OUTPATIENT
Start: 2022-10-12

## 2022-10-12 RX ORDER — ACETAMINOPHEN 325 MG/1
650 TABLET ORAL EVERY 4 HOURS PRN
Status: CANCELLED | OUTPATIENT
Start: 2022-10-12

## 2022-10-12 RX ORDER — ASPIRIN 81 MG/1
324 TABLET, CHEWABLE ORAL ONCE
Status: CANCELLED | OUTPATIENT
Start: 2022-10-12 | End: 2022-10-12

## 2022-10-12 RX ORDER — SODIUM CHLORIDE 0.9 % (FLUSH) 0.9 %
10 SYRINGE (ML) INJECTION AS NEEDED
Status: CANCELLED | OUTPATIENT
Start: 2022-10-12

## 2022-10-12 RX ORDER — NITROGLYCERIN 0.4 MG/1
0.4 TABLET SUBLINGUAL
Status: CANCELLED | OUTPATIENT
Start: 2022-10-12

## 2022-10-13 ENCOUNTER — HOSPITAL ENCOUNTER (OUTPATIENT)
Facility: HOSPITAL | Age: 42
Setting detail: HOSPITAL OUTPATIENT SURGERY
Discharge: HOME | End: 2022-10-13
Attending: INTERNAL MEDICINE | Admitting: INTERNAL MEDICINE

## 2022-10-13 VITALS
SYSTOLIC BLOOD PRESSURE: 142 MMHG | HEIGHT: 70 IN | RESPIRATION RATE: 18 BRPM | DIASTOLIC BLOOD PRESSURE: 79 MMHG | TEMPERATURE: 97.3 F | WEIGHT: 228.84 LBS | OXYGEN SATURATION: 92 % | HEART RATE: 62 BPM | BODY MASS INDEX: 32.76 KG/M2

## 2022-10-13 DIAGNOSIS — I25.119 CORONARY ARTERY DISEASE INVOLVING NATIVE CORONARY ARTERY OF NATIVE HEART WITH ANGINA PECTORIS: Primary | ICD-10-CM

## 2022-10-13 DIAGNOSIS — I25.10 CAD IN NATIVE ARTERY: ICD-10-CM

## 2022-10-13 DIAGNOSIS — E78.5 HYPERLIPIDEMIA LDL GOAL <70: ICD-10-CM

## 2022-10-13 DIAGNOSIS — I25.10 CORONARY ARTERY DISEASE: ICD-10-CM

## 2022-10-13 DIAGNOSIS — R93.1 ABNORMAL CT SCAN OF HEART: ICD-10-CM

## 2022-10-13 DIAGNOSIS — R07.89 CHEST PAIN, ATYPICAL: ICD-10-CM

## 2022-10-13 LAB
ALBUMIN SERPL-MCNC: 4.3 G/DL (ref 3.5–5.2)
ALBUMIN/GLOB SERPL: 1.3 G/DL
ALP SERPL-CCNC: 92 U/L (ref 39–117)
ALT SERPL W P-5'-P-CCNC: 20 U/L (ref 1–41)
ANION GAP SERPL CALCULATED.3IONS-SCNC: 9 MMOL/L (ref 5–15)
AST SERPL-CCNC: 15 U/L (ref 1–40)
BASOPHILS # BLD AUTO: 0.08 10*3/MM3 (ref 0–0.2)
BASOPHILS NFR BLD AUTO: 0.7 % (ref 0–1.5)
BILIRUB SERPL-MCNC: 0.4 MG/DL (ref 0–1.2)
BUN SERPL-MCNC: 7 MG/DL (ref 6–20)
BUN/CREAT SERPL: 8.1 (ref 7–25)
CALCIUM SPEC-SCNC: 9.2 MG/DL (ref 8.6–10.5)
CHLORIDE SERPL-SCNC: 104 MMOL/L (ref 98–107)
CHOLEST SERPL-MCNC: 193 MG/DL (ref 0–200)
CO2 SERPL-SCNC: 23 MMOL/L (ref 22–29)
CREAT BLDA-MCNC: 0.8 MG/DL (ref 0.6–1.3)
CREAT SERPL-MCNC: 0.86 MG/DL (ref 0.76–1.27)
DEPRECATED RDW RBC AUTO: 40.8 FL (ref 37–54)
EGFRCR SERPLBLD CKD-EPI 2021: 110.9 ML/MIN/1.73
EOSINOPHIL # BLD AUTO: 0.18 10*3/MM3 (ref 0–0.4)
EOSINOPHIL NFR BLD AUTO: 1.6 % (ref 0.3–6.2)
ERYTHROCYTE [DISTWIDTH] IN BLOOD BY AUTOMATED COUNT: 12.1 % (ref 12.3–15.4)
GLOBULIN UR ELPH-MCNC: 3.2 GM/DL
GLUCOSE SERPL-MCNC: 100 MG/DL (ref 65–99)
HBA1C MFR BLD: 5.5 % (ref 4.8–5.6)
HCT VFR BLD AUTO: 43.8 % (ref 37.5–51)
HDLC SERPL-MCNC: 22 MG/DL (ref 40–60)
HGB BLD-MCNC: 15.5 G/DL (ref 13–17.7)
IMM GRANULOCYTES # BLD AUTO: 0.05 10*3/MM3 (ref 0–0.05)
IMM GRANULOCYTES NFR BLD AUTO: 0.5 % (ref 0–0.5)
LDLC SERPL CALC-MCNC: 127 MG/DL (ref 0–100)
LDLC/HDLC SERPL: 5.55 {RATIO}
LYMPHOCYTES # BLD AUTO: 2.32 10*3/MM3 (ref 0.7–3.1)
LYMPHOCYTES NFR BLD AUTO: 21.2 % (ref 19.6–45.3)
MCH RBC QN AUTO: 32.6 PG (ref 26.6–33)
MCHC RBC AUTO-ENTMCNC: 35.4 G/DL (ref 31.5–35.7)
MCV RBC AUTO: 92 FL (ref 79–97)
MONOCYTES # BLD AUTO: 0.59 10*3/MM3 (ref 0.1–0.9)
MONOCYTES NFR BLD AUTO: 5.4 % (ref 5–12)
NEUTROPHILS NFR BLD AUTO: 7.71 10*3/MM3 (ref 1.7–7)
NEUTROPHILS NFR BLD AUTO: 70.6 % (ref 42.7–76)
NRBC BLD AUTO-RTO: 0 /100 WBC (ref 0–0.2)
PLATELET # BLD AUTO: 221 10*3/MM3 (ref 140–450)
PMV BLD AUTO: 10.5 FL (ref 6–12)
POTASSIUM SERPL-SCNC: 4.3 MMOL/L (ref 3.5–5.2)
PROT SERPL-MCNC: 7.5 G/DL (ref 6–8.5)
RBC # BLD AUTO: 4.76 10*6/MM3 (ref 4.14–5.8)
SODIUM SERPL-SCNC: 136 MMOL/L (ref 136–145)
TRIGL SERPL-MCNC: 245 MG/DL (ref 0–150)
VLDLC SERPL-MCNC: 44 MG/DL (ref 5–40)
WBC NRBC COR # BLD: 10.93 10*3/MM3 (ref 3.4–10.8)

## 2022-10-13 PROCEDURE — 93458 L HRT ARTERY/VENTRICLE ANGIO: CPT | Performed by: INTERNAL MEDICINE

## 2022-10-13 PROCEDURE — 0 IOPAMIDOL PER 1 ML: Performed by: INTERNAL MEDICINE

## 2022-10-13 PROCEDURE — 99214 OFFICE O/P EST MOD 30 MIN: CPT | Performed by: INTERNAL MEDICINE

## 2022-10-13 PROCEDURE — 82565 ASSAY OF CREATININE: CPT

## 2022-10-13 PROCEDURE — 80061 LIPID PANEL: CPT | Performed by: NURSE PRACTITIONER

## 2022-10-13 PROCEDURE — 25010000002 FENTANYL CITRATE (PF) 50 MCG/ML SOLUTION: Performed by: INTERNAL MEDICINE

## 2022-10-13 PROCEDURE — 85025 COMPLETE CBC W/AUTO DIFF WBC: CPT | Performed by: NURSE PRACTITIONER

## 2022-10-13 PROCEDURE — 83036 HEMOGLOBIN GLYCOSYLATED A1C: CPT | Performed by: NURSE PRACTITIONER

## 2022-10-13 PROCEDURE — S0260 H&P FOR SURGERY: HCPCS

## 2022-10-13 PROCEDURE — C1894 INTRO/SHEATH, NON-LASER: HCPCS | Performed by: INTERNAL MEDICINE

## 2022-10-13 PROCEDURE — 25010000002 HEPARIN (PORCINE) PER 1000 UNITS: Performed by: INTERNAL MEDICINE

## 2022-10-13 PROCEDURE — C1769 GUIDE WIRE: HCPCS | Performed by: INTERNAL MEDICINE

## 2022-10-13 PROCEDURE — 80053 COMPREHEN METABOLIC PANEL: CPT | Performed by: NURSE PRACTITIONER

## 2022-10-13 RX ORDER — FENTANYL CITRATE 50 UG/ML
INJECTION, SOLUTION INTRAMUSCULAR; INTRAVENOUS
Status: DISCONTINUED | OUTPATIENT
Start: 2022-10-13 | End: 2022-10-13 | Stop reason: HOSPADM

## 2022-10-13 RX ORDER — SODIUM CHLORIDE 9 MG/ML
3 INJECTION, SOLUTION INTRAVENOUS CONTINUOUS
Status: ACTIVE | OUTPATIENT
Start: 2022-10-13 | End: 2022-10-13

## 2022-10-13 RX ORDER — ASPIRIN 81 MG/1
81 TABLET ORAL DAILY
Qty: 90 TABLET | Refills: 3 | Status: SHIPPED | OUTPATIENT
Start: 2022-10-13

## 2022-10-13 RX ORDER — HEPARIN SODIUM 1000 [USP'U]/ML
INJECTION, SOLUTION INTRAVENOUS; SUBCUTANEOUS
Status: DISCONTINUED | OUTPATIENT
Start: 2022-10-13 | End: 2022-10-13 | Stop reason: HOSPADM

## 2022-10-13 RX ORDER — SODIUM CHLORIDE 0.9 % (FLUSH) 0.9 %
10 SYRINGE (ML) INJECTION AS NEEDED
Status: DISCONTINUED | OUTPATIENT
Start: 2022-10-13 | End: 2022-10-13 | Stop reason: HOSPADM

## 2022-10-13 RX ORDER — LIDOCAINE HYDROCHLORIDE 10 MG/ML
INJECTION, SOLUTION EPIDURAL; INFILTRATION; INTRACAUDAL; PERINEURAL
Status: DISCONTINUED | OUTPATIENT
Start: 2022-10-13 | End: 2022-10-13 | Stop reason: HOSPADM

## 2022-10-13 RX ORDER — ACETAMINOPHEN 325 MG/1
650 TABLET ORAL EVERY 4 HOURS PRN
Status: DISCONTINUED | OUTPATIENT
Start: 2022-10-13 | End: 2022-10-13 | Stop reason: HOSPADM

## 2022-10-13 RX ORDER — ASPIRIN 81 MG/1
81 TABLET ORAL DAILY
Status: DISCONTINUED | OUTPATIENT
Start: 2022-10-14 | End: 2022-10-13 | Stop reason: HOSPADM

## 2022-10-13 RX ORDER — SODIUM CHLORIDE 0.9 % (FLUSH) 0.9 %
10 SYRINGE (ML) INJECTION EVERY 12 HOURS SCHEDULED
Status: DISCONTINUED | OUTPATIENT
Start: 2022-10-13 | End: 2022-10-13 | Stop reason: HOSPADM

## 2022-10-13 RX ORDER — ASPIRIN 81 MG/1
324 TABLET, CHEWABLE ORAL ONCE
Status: COMPLETED | OUTPATIENT
Start: 2022-10-13 | End: 2022-10-13

## 2022-10-13 RX ORDER — NITROGLYCERIN 0.4 MG/1
0.4 TABLET SUBLINGUAL
Status: DISCONTINUED | OUTPATIENT
Start: 2022-10-13 | End: 2022-10-13 | Stop reason: HOSPADM

## 2022-10-13 RX ORDER — ROSUVASTATIN CALCIUM 20 MG/1
20 TABLET, COATED ORAL DAILY
Qty: 90 TABLET | Refills: 3 | Status: SHIPPED | OUTPATIENT
Start: 2022-10-13 | End: 2023-02-06 | Stop reason: SDUPTHER

## 2022-10-13 RX ORDER — METOPROLOL SUCCINATE 25 MG/1
25 TABLET, EXTENDED RELEASE ORAL DAILY
Qty: 90 TABLET | Refills: 3 | Status: SHIPPED | OUTPATIENT
Start: 2022-10-13 | End: 2022-10-13 | Stop reason: HOSPADM

## 2022-10-13 RX ORDER — NICARDIPINE HCL-0.9% SOD CHLOR 1 MG/10 ML
SYRINGE (ML) INTRAVENOUS
Status: DISCONTINUED | OUTPATIENT
Start: 2022-10-13 | End: 2022-10-13 | Stop reason: HOSPADM

## 2022-10-13 RX ORDER — ISOSORBIDE MONONITRATE 30 MG/1
30 TABLET, EXTENDED RELEASE ORAL EVERY MORNING
Qty: 90 TABLET | Refills: 3 | Status: SHIPPED | OUTPATIENT
Start: 2022-10-13

## 2022-10-13 RX ORDER — OMEPRAZOLE 40 MG/1
40 CAPSULE, DELAYED RELEASE ORAL DAILY
COMMUNITY

## 2022-10-13 RX ADMIN — ASPIRIN 81 MG 324 MG: 81 TABLET ORAL at 09:38

## 2022-10-13 RX ADMIN — SODIUM CHLORIDE 3 ML/KG/HR: 9 INJECTION, SOLUTION INTRAVENOUS at 09:38

## 2022-10-13 NOTE — H&P
Pre-cardiac Catheterization History and Physical  [facility] at [facility]      Patient:  Shoaib Eng  :  1980  MRN: [id number]    PCP:  Donald Awad MD  PHONE:  [phone]    DATE: 10/13/2022  ID: Shoaib Eng is a 42 y.o. male     CC: chest pain, CT coronary angiogram abnormality    PROBLEM LIST:   Active Hospital Problems    Diagnosis  POA   • Chest pain, atypical [R07.89]  Unknown     Added automatically from request for surgery [id number]     • Abnormal CT scan of heart [R93.1]  Unknown     Added automatically from request for surgery [id number]     • Tobacco use [Z72.0]  Yes   • Coronary artery disease involving coronary bypass graft of native heart with angina pectoris (HCC) [I25.709]  Unknown     · Echocardiogram (5/3/2022): LVEF 65%. Moderate concentric hypertrophy.  · Nuclear stress test (2022): No evidence of ischemia. LVEF 64%.   · CT coronary angiogram (10/7/2022): Possible  of left circumflex.  Moderate proximal LAD disease.           BRIEF HPI:   Mr. Heard is a 42-year-old male with the above cardiac history presenting today for left heart cath +/- catheter-based intervention.  He had a syncopal episode in April while having a bowel movement.  He awoke with significant chest pain and presented to [facility] ED where he was ruled out for acute MI and CT chest revealed no pulmonary embolism.  An outpatient stress test was performed on 2022 which was normal.  Patient continues to have intermittent episodes of chest pain she describes as pressure with radiation to his arm that appear random and not necessarily provoked by exertion, position, breathing or eating.  CTA coronary on 10/7/2022 showed calcium score of 1.4, but revealed several abnormalities including aberrant origin and course of the left circumflex with the appearance of severe stenosis or occlusion, moderate narrowing of the proximal mid LAD possible artifact, and 30 to 40%  mid RCA stenosis.  This morning patient reports that he has not had chest pain in 2 weeks but has felt increased fatigue and was experiencing chest pain twice a week prior to 2 weeks ago.  He describes his chest pain as tightness in the middle of his chest radiating to his left arm with associated shortness of breath, diaphoresis and nausea.  Patient denies palpitations, worsening lower extremity edema, or syncopal events since April. patient had no known cardiac disease prior to April of this year.  Patient is a smoker and has family history of heart disease.        Cardiac Risk Factors: family history of premature cardiovascular disease, male gender, obesity (BMI >= 30 kg/m2), sedentary lifestyle and smoking/ tobacco exposure    Prior Cardiac Investigations:   CTA coronary 10/7/2022:  IMPRESSION:  1. Total calcium score of 1.4, considered minimal.  2. 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  3. Appearance of moderate narrowing of the proximal-mid LAD, favored to  be artifactual.  4. 30% to 40% mid RCA stenosis.  5. Shotty diffuse mediastinal and bilateral hilar lymphadenopathy,  nonspecific. Reactive adenopathy, including chronic adenopathy,  sarcoidosis, and less likely lymphoma and metastatic disease are  considerations. If this is not known from outside studies, follow-up,  perhaps with pulmonology service, might be considered.    Allergies:      Allergies   Allergen Reactions   • Hurricaine-Dry Handle [Benzocaine] Other (See Comments)     Stops breathing and turns gray   • Ativan [Lorazepam]    • Penicillins        MEDICATIONS:  Current Outpatient Medications   Medication Instructions   • ALPRAZolam (XANAX) 0.25 MG tablet 1 tablet, Oral, Daily   • gabapentin (NEURONTIN) 600 MG tablet Oral, 3 Times Daily   • HYDROcodone-acetaminophen (NORCO) 7.5-325 MG per tablet 1 tablet, Oral, Every 6 Hours PRN   • nicotine (Nicoderm CQ) 14  MG/24HR patch 1 patch, Transdermal, Every 24 Hours   • nitroglycerin (NITROSTAT) 0.4 mg, Sublingual, Every 5 Minutes PRN, Take no more than 3 doses in 15 minutes.   • omeprazole (PRILOSEC) 40 mg, Oral, Daily       Past medical & surgical history, social and family history reviewed in the electronic medical record.    ROS: Pertinent positives listed in the HPI and problem list above. All others reviewed and negative.     Physical Exam:   There were no vitals taken for this visit.    Constitutional:    Well-appearing 42 y.o. y/o adult in no acute distress   Neck:     No Jugular venous distention    Heart:    Regular rhythm and normal rate, no murmurs, rubs or gallops   Lungs:     Clear to auscultation bilaterally, no wheezes, rhales or rhonchi, nonlabored respirations       Extremities:   Burn on r forearm, no edema, clubbing, or cyanosis.    Pulses:    Neuro:  Psych:   Radial and dorsalis pedis pulses palpable and equal bilaterally.  No gross focal deficits  Mood and behavior appropriate for situation     Barbaeu Test:  Left: Normal  (oxymetric Allens) Right: Normal     Labs and Diagnostic Data:  Lab Results   Component Value Date    GLUCOSE 100 (H) 10/13/2022    BUN 7 10/13/2022    CREATININE 0.80 10/13/2022    EGFRIFNONA 110 08/29/2017    BCR 8.1 10/13/2022    K 4.3 10/13/2022    CO2 23.0 10/13/2022    CALCIUM 9.2 10/13/2022    ALBUMIN 4.30 10/13/2022    AST 15 10/13/2022    ALT 20 10/13/2022     Lab Results   Component Value Date    WBC 10.93 (H) 10/13/2022    HGB 15.5 10/13/2022    HCT 43.8 10/13/2022    MCV 92.0 10/13/2022     10/13/2022     Lab Results   Component Value Date    CHOL 193 10/13/2022    TRIG 245 (H) 10/13/2022    HDL 22 (L) 10/13/2022     (H) 10/13/2022     No results found for: HGBA1C      Radiology: no n ew data  Tele: Normal sinus rhythm, rate 62    IMPRESSION:  · Mr. Hyatt is a 42-year-old male smoker with no cardiac history prior to a syncopal event in April when he woke up with  severe chest pain.  ED visit ruled out ACS and PE with normal stress in May. Due to CTA coronary 10/22 with abnormal findings along with continued chest pain, patient presents today for left heart cath.  · Patient has a burn on right forearm.    PLAN:  · Procedure to perform: LHC +/- CBI. Risks, benefits and alternatives to the procedure explained to the patient and he understands and wishes to proceed.     Scribed by Cameron Ramirez PA-C for Dr. Walter Aj MD on 10/13/2022

## 2022-10-13 NOTE — DISCHARGE SUMMARY
Discharge Summary  [place], [place]    Patient Name: Shoaib Eng  : 1980  MRN: [id number]    Date of Admission: 10/13/2022  Date of Discharge:  10/13/2022    IDENTIFICATION:  Shoaib Eng is a 42 y.o. male who lives in [place], [place]    Active Hospital Problems    Diagnosis  POA   • **Coronary artery disease involving native coronary artery of native heart with angina pectoris (HCC) [I25.119]  Yes     Priority: High     · Echocardiogram (5/3/2022): LVEF 65%. Moderate concentric hypertrophy.  · Nuclear stress test (2022): No evidence of ischemia. LVEF 64%.   · CCS class II angina  · CT coronary angiogram (10/7/2022): Possible  of left circumflex.  Moderate proximal LAD disease.  · Cardiac catheterization (3/13/2022): Severe 1-vessel CAD involving chronic total occlusion of the left circumflex.  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  Underwent stress testing earlier this year which was normal.  Due to persistent symptoms and concern of heart disease based on a family member who had a heart attack, he underwent CT coronary angiogram.  This showed abnormality of the left circumflex as well as mild to moderate disease in the LAD.  To confirm the anatomy, cardiac catheterization was recommended.    Cardiac catheterization performed 10/13/2022.  He was found to have severe one-vessel coronary artery disease involving a chronic occlusion of an impairment left circumflex coronary artery.  The origin of the artery appears to be from the right coronary cusp.  This is consistent with findings on the CT coronary angiogram.  The ostium of the anomalous vessel was not  demonstrated on angiography, however.    Percutaneous intervention for this would be extremely difficult due to lack of definition of the ostium of the occlusion and anomalous nature.  This should be treated medically.  Aspirin and high intensity statin therapy are being initiated today.  CMP and lipids will be needed in 6 weeks.  The patient will need antianginal medical therapy.  His resting heart rate is <60 bpm, so beta-blocker will likely be not well-tolerated.  Instead, we are starting isosorbide mononitrate 30 mg daily.  Patient was advised not to use phosphodiesterase inhibitor therapy.      Smoking cessation strongly recommended    Patient will follow-up with Sarah Keller in January 2023.  I expect the patient will have acceptable anginal symptoms after initiation of medical therapy.      Procedures Performed  · Left Heart Cath         Pertinent Test Results:  Results from last 7 days   Lab Units 10/13/22  0903 10/13/22  0852 10/07/22  1034   WBC 10*3/mm3  --  10.93*  --    HEMOGLOBIN g/dL  --  15.5  --    HEMATOCRIT %  --  43.8  --    PLATELETS 10*3/mm3  --  221  --    SODIUM mmol/L  --  136 137   POTASSIUM mmol/L  --  4.3 4.3   CHLORIDE mmol/L  --  104 103   CO2 mmol/L  --  23.0 23.7   BUN mg/dL  --  7 7   CREATININE mg/dL 0.80 0.86 0.76   GLUCOSE mg/dL  --  100* 85   CALCIUM mg/dL  --  9.2 8.9     Results from last 7 days   Lab Units 10/13/22  0852 10/07/22  1034   BILIRUBIN mg/dL 0.4 0.4   ALK PHOS U/L 92 84   ALT (SGPT) U/L 20 24   AST (SGOT) U/L 15 16      Results from last 7 days   Lab Units 10/13/22  0852 10/07/22  1034   CHOLESTEROL mg/dL 193 175   TRIGLYCERIDES mg/dL 245* 184*   HDL CHOL mg/dL 22* 23*   LDL CHOL mg/dL 127* 119*     Results from last 7 days   Lab Units 10/13/22  0852   HEMOGLOBIN A1C % 5.50       CT Angiogram Coronary    Result Date: 10/11/2022  Impression: 1. Total calcium score of 1.4, considered minimal. 2. 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 3. Appearance of moderate narrowing of the proximal-mid LAD, favored to be artifactual. 4. 30% to 40% mid RCA stenosis. 5. Shotty diffuse mediastinal and bilateral hilar lymphadenopathy, nonspecific. Reactive adenopathy, including chronic adenopathy, sarcoidosis, and less likely lymphoma and metastatic disease are considerations. If this is not known from outside studies, follow-up, perhaps with pulmonology service, might be considered.  NOTE: Preliminary findings of this exam were discussed with the ordering provider, Surinder Aj MD, at approximately 9:50 AM, 10/10/2022.  This report was finalized on 10/11/2022 8:25 AM by Dr. Alonso Bush MD.            Results for orders placed during the hospital encounter of 05/03/22    Adult Transthoracic Echo Complete W/ Cont if Necessary Per Protocol    Interpretation Summary  · Left ventricular wall thickness is consistent with moderate concentric hypertrophy.  · Left ventricular ejection fraction appears to be 61 - 65%. Left ventricular systolic function is normal.  · Left ventricular diastolic function was normal.  · Estimated right ventricular systolic pressure from tricuspid regurgitation is normal (<35 mmHg).        Physical Exam at Discharge    Vital Signs  Temp:  [97.3 °F (36.3 °C)] 97.3 °F (36.3 °C)  Heart Rate:  [56-73] 62  Resp:  [16-18] 18  BP: (112-153)/(69-89) 142/79    Constitutional:       Appearance: Healthy appearance. Well-developed.   Eyes:      General: Lids are normal. No scleral icterus.  HENT:      Head: Normocephalic and atraumatic.   Neck:      Thyroid: No thyroid mass.      Vascular: No carotid bruit or JVD. JVD normal.   Pulmonary:      Effort: Pulmonary effort is normal.      Breath sounds: Normal breath sounds.   Cardiovascular:      Normal rate. Regular rhythm.      Murmurs: There is no murmur.      No gallop.   Musculoskeletal:      Extremities: No clubbing present.Skin:     General:  Skin is warm and dry. There is no cyanosis.   Neurological:      General: No focal deficit present.      Mental Status: Alert.   Psychiatric:         Attention and Perception: Attention normal.         Behavior: Behavior normal. Behavior is cooperative.          Discharge Disposition  Home or Self Care         Discharge Medications      New Medications      Instructions Start Date   aspirin 81 MG EC tablet   81 mg, Oral, Daily      isosorbide mononitrate 30 MG 24 hr tablet  Commonly known as: IMDUR   30 mg, Oral, Every Morning      rosuvastatin 20 MG tablet  Commonly known as: CRESTOR   20 mg, Oral, Daily         Continue These Medications      Instructions Start Date   ALPRAZolam 0.25 MG tablet  Commonly known as: XANAX   1 tablet, Oral, Daily      gabapentin 600 MG tablet  Commonly known as: NEURONTIN   Oral, 3 Times Daily      HYDROcodone-acetaminophen 7.5-325 MG per tablet  Commonly known as: NORCO   1 tablet, Oral, Every 6 Hours PRN      nicotine 14 MG/24HR patch  Commonly known as: Nicoderm CQ   1 patch, Transdermal, Every 24 Hours      nitroglycerin 0.4 MG SL tablet  Commonly known as: NITROSTAT   0.4 mg, Sublingual, Every 5 Minutes PRN, Take no more than 3 doses in 15 minutes.      omeprazole 40 MG capsule  Commonly known as: priLOSEC   40 mg, Oral, Daily             Discharge Diet: Low carbohydrate    Activity at Discharge: Ad sandee.    Future Appointments   Date Time Provider Department Center   1/17/2023 11:20 AM Erica Keller APRN [facility]     Additional Instructions for the Follow-ups that You Need to Schedule     Discharge Follow-up with PCP   As directed       Currently Documented PCP:    Donald Awad MD    PCP Phone Number:    [phone]     Follow Up Details: See in 2 weeks         Discharge Follow-up with Specified Provider: Surinder Aj; 3 Months   As directed      To: Surinder Aj    Follow Up: 3 Months    Follow Up Details: FU LHC in [facility]  Metabolic Panel    Nov 24, 2022 (Approximate)      Release to patient: Routine Release         Lipid Panel    Nov 24, 2022 (Approximate)              Electronically signed by Walter Aj IV, MD, 10/13/22, 4:32 PM EDT.    Time: Discharge 20 min

## 2022-12-12 ENCOUNTER — APPOINTMENT (OUTPATIENT)
Dept: CT IMAGING | Facility: HOSPITAL | Age: 42
End: 2022-12-12

## 2022-12-12 ENCOUNTER — HOSPITAL ENCOUNTER (EMERGENCY)
Facility: HOSPITAL | Age: 42
Discharge: HOME | End: 2022-12-12
Attending: EMERGENCY MEDICINE | Admitting: EMERGENCY MEDICINE

## 2022-12-12 VITALS
TEMPERATURE: 98.4 F | HEART RATE: 71 BPM | SYSTOLIC BLOOD PRESSURE: 121 MMHG | WEIGHT: 220 LBS | OXYGEN SATURATION: 96 % | DIASTOLIC BLOOD PRESSURE: 64 MMHG | RESPIRATION RATE: 16 BRPM | HEIGHT: 71 IN | BODY MASS INDEX: 30.8 KG/M2

## 2022-12-12 DIAGNOSIS — S23.41XA SPRAIN OF COSTAL CARTILAGE, INITIAL ENCOUNTER: Primary | ICD-10-CM

## 2022-12-12 PROCEDURE — 71250 CT THORAX DX C-: CPT | Performed by: RADIOLOGY

## 2022-12-12 PROCEDURE — 99282 EMERGENCY DEPT VISIT SF MDM: CPT

## 2022-12-12 PROCEDURE — 71250 CT THORAX DX C-: CPT

## 2022-12-12 PROCEDURE — 72128 CT CHEST SPINE W/O DYE: CPT | Performed by: RADIOLOGY

## 2022-12-12 PROCEDURE — 25010000002 KETOROLAC TROMETHAMINE PER 15 MG: Performed by: PHYSICIAN ASSISTANT

## 2022-12-12 PROCEDURE — 25010000002 ORPHENADRINE CITRATE PER 60 MG: Performed by: PHYSICIAN ASSISTANT

## 2022-12-12 PROCEDURE — 72131 CT LUMBAR SPINE W/O DYE: CPT | Performed by: RADIOLOGY

## 2022-12-12 PROCEDURE — 72128 CT CHEST SPINE W/O DYE: CPT

## 2022-12-12 PROCEDURE — 72131 CT LUMBAR SPINE W/O DYE: CPT

## 2022-12-12 PROCEDURE — 25010000002 DEXAMETHASONE SODIUM PHOSPHATE 10 MG/ML SOLUTION: Performed by: PHYSICIAN ASSISTANT

## 2022-12-12 PROCEDURE — 96372 THER/PROPH/DIAG INJ SC/IM: CPT

## 2022-12-12 RX ORDER — KETOROLAC TROMETHAMINE 30 MG/ML
60 INJECTION, SOLUTION INTRAMUSCULAR; INTRAVENOUS ONCE
Status: COMPLETED | OUTPATIENT
Start: 2022-12-12 | End: 2022-12-12

## 2022-12-12 RX ORDER — KETOROLAC TROMETHAMINE 10 MG/1
10 TABLET, FILM COATED ORAL EVERY 6 HOURS PRN
Qty: 20 TABLET | Refills: 0 | Status: SHIPPED | OUTPATIENT
Start: 2022-12-12 | End: 2022-12-17

## 2022-12-12 RX ORDER — ORPHENADRINE CITRATE 30 MG/ML
60 INJECTION INTRAMUSCULAR; INTRAVENOUS ONCE
Status: COMPLETED | OUTPATIENT
Start: 2022-12-12 | End: 2022-12-12

## 2022-12-12 RX ORDER — CYCLOBENZAPRINE HCL 10 MG
10 TABLET ORAL 3 TIMES DAILY PRN
Qty: 15 TABLET | Refills: 0 | Status: SHIPPED | OUTPATIENT
Start: 2022-12-12 | End: 2022-12-17

## 2022-12-12 RX ORDER — DEXAMETHASONE SODIUM PHOSPHATE 10 MG/ML
10 INJECTION, SOLUTION INTRAMUSCULAR; INTRAVENOUS ONCE
Status: COMPLETED | OUTPATIENT
Start: 2022-12-12 | End: 2022-12-12

## 2022-12-12 RX ADMIN — KETOROLAC TROMETHAMINE 60 MG: 30 INJECTION, SOLUTION INTRAMUSCULAR; INTRAVENOUS at 12:36

## 2022-12-12 RX ADMIN — ORPHENADRINE CITRATE 60 MG: 30 INJECTION INTRAMUSCULAR; INTRAVENOUS at 12:36

## 2022-12-12 RX ADMIN — DEXAMETHASONE SODIUM PHOSPHATE 10 MG: 10 INJECTION INTRAMUSCULAR; INTRAVENOUS at 12:36

## 2022-12-12 NOTE — ED NOTES
MEDICAL SCREENING:    Reason for Visit: left sided rib and low back pain     Patient initially seen in triage.  The patient was advised further evaluation and diagnostic testing will be needed, some of the treatment and testing will be initiated in the lobby in order to begin the process.  The patient will be returned to the waiting area for the time being and possibly be re-assessed by a subsequent ED provider.  The patient will be brought back to the treatment area in as timely manner as possible.       Divina Harris PA  12/12/22 1123

## 2022-12-12 NOTE — DISCHARGE INSTRUCTIONS
Please take your medications as prescribed and utilize rest and ice. Please follow up with your PCP in 2 days or return to ER if symptoms worsen.

## 2022-12-12 NOTE — ED PROVIDER NOTES
Subjective   History of Present Illness  This is a 42 year old male patient who presents to the ER with chief complaint of left rib pain. Prior to arrival, the patient was leaning over a car working on it when he felt a popping sensation in the left side of his ribs. He has since then had pain in his left sided ribs radiating into the mid back region bilaterally. Pain is aching, moderate and constant. No numbness/tingling.         Review of Systems   Constitutional: Negative.  Negative for fever.   HENT: Negative.    Respiratory: Negative.    Cardiovascular: Negative.  Negative for chest pain.   Gastrointestinal: Negative.  Negative for abdominal pain.   Endocrine: Negative.    Genitourinary: Negative.  Negative for dysuria.   Musculoskeletal: Positive for back pain. Negative for arthralgias, gait problem, joint swelling, myalgias, neck pain and neck stiffness.        Left sided rib pain    Skin: Negative.    Neurological: Negative.    Psychiatric/Behavioral: Negative.    All other systems reviewed and are negative.      Past Medical History:   Diagnosis Date   • Anxiety    • Family history of stress    • History of echocardiogram    • Hyperlipemia    • Hyperlipidemia    • Neuropathy        Allergies   Allergen Reactions   • Hurricaine-Dry Handle [Benzocaine] Other (See Comments)     Stops breathing and turns gray   • Ativan [Lorazepam]    • Chantix [Varenicline] Hallucinations   • Penicillins        Past Surgical History:   Procedure Laterality Date   • APPENDECTOMY     • BONE SPUR ARM     • CARDIAC CATHETERIZATION Left 10/13/2022    Procedure: Left Heart Cath;  Surgeon: Walter Aj IV, MD;  [facility]ATH [facility]AS[facility];  Service: Cardiovascular;  Laterality: Left;   • CHOLECYSTECTOMY         Family History   Problem Relation Age of Onset   • Heart disease Father    • Heart attack Father        Social History     Socioeconomic History   • Marital status:    Tobacco Use   • Smoking  status: Every Day     Packs/day: 0.50     Types: Cigarettes   • Smokeless tobacco: Never   Substance and Sexual Activity   • Alcohol use: No   • Drug use: No   • Sexual activity: Defer           Objective   Physical Exam  Vitals and nursing note reviewed.   Constitutional:       General: He is not in acute distress.     Appearance: He is well-developed. He is not diaphoretic.   HENT:      Head: Normocephalic and atraumatic.      Right Ear: External ear normal.      Left Ear: External ear normal.      Nose: Nose normal.   Eyes:      Conjunctiva/sclera: Conjunctivae normal.      Pupils: Pupils are equal, round, and reactive to light.   Neck:      Vascular: No JVD.      Trachea: No tracheal deviation.   Cardiovascular:      Rate and Rhythm: Normal rate and regular rhythm.      Heart sounds: Normal heart sounds. No murmur heard.  Pulmonary:      Effort: Pulmonary effort is normal. No respiratory distress.      Breath sounds: Normal breath sounds. No wheezing.   Abdominal:      General: Bowel sounds are normal.      Palpations: Abdomen is soft.      Tenderness: There is no abdominal tenderness.   Musculoskeletal:         General: Tenderness and signs of injury present. No swelling or deformity. Normal range of motion.      Cervical back: Normal range of motion and neck supple.      Comments: Skin about ribs and back intact with no bruising, abrasion, edema. Tenderness to palpation noted in the left sided ribs.    Skin:     General: Skin is warm and dry.      Coloration: Skin is not pale.      Findings: No erythema or rash.   Neurological:      Mental Status: He is alert and oriented to person, place, and time.      Cranial Nerves: No cranial nerve deficit.   Psychiatric:         Behavior: Behavior normal.         Thought Content: Thought content normal.         Procedures           ED Course  ED Course as of 12/12/22 1242   Mon Dec 12, 2022   1216 CT Lumbar Spine Without Contrast  IMPRESSION:    No acute findings in the  lumbar spine.     This report was finalized on 12/12/2022 12:10 PM by Dr. Vignesh Whelan MD [MM]   1217 CT Thoracic Spine Without Contrast  IMPRESSION:    Slight decreased height of T11 and T8 but neither appear to be acute.     This report was finalized on 12/12/2022 12:10 PM by Dr. Vignesh Whelan MD [MM]   1217 CT Chest Without Contrast Diagnostic  IMPRESSION:     1. No acute bony abnormality  2. No pneumothorax  3. Small nodule in the right lung           This report was finalized on 12/12/2022 12:09 PM by Dr. Vignesh Whelan MD [MM]   1228 Patient diagnosed with left rib strain. Will be d/c home with rx for toradol and flexeril. Will f/u with PCP in 2 days or return to ER if symptoms worsen.  [MM]      ED Course User Index  [MM] Divina Harris PA                                           MDM  Number of Diagnoses or Management Options     Amount and/or Complexity of Data Reviewed  Tests in the radiology section of CPT®: ordered and reviewed  Discuss the patient with other providers: yes        Final diagnoses:   Sprain of costal cartilage, initial encounter       ED Disposition  ED Disposition     ED Disposition   Discharge    Condition   Stable    Comment   --             Donald Awad MD  [address] [facility] [address]  [phone]    In 2 days           Medication List      New Prescriptions    cyclobenzaprine 10 MG tablet  Commonly known as: FLEXERIL  Take 1 tablet by mouth 3 (Three) Times a Day As Needed for Muscle Spasms for up to 5 days.     ketorolac 10 MG tablet  Commonly known as: TORADOL  Take 1 tablet by mouth Every 6 (Six) Hours As Needed for Moderate Pain for up to 5 days.           Where to Get Your Medications      These medications were sent to [place], [place] - [address] [facility] - [phone]  - [phone] FX  [address] [facility] [address]    Phone: [phone]   · cyclobenzaprine 10 MG tablet  · ketorolac 10 MG tablet          Divina Harris  EDMUND SOSA  12/12/22 4710

## 2022-12-16 ENCOUNTER — PATIENT OUTREACH (OUTPATIENT)
Dept: CASE MANAGEMENT | Facility: OTHER | Age: 42
End: 2022-12-16

## 2022-12-16 NOTE — OUTREACH NOTE
AMBULATORY CASE MANAGEMENT NOTE    Name and Relationship of Patient/Support Person: Shoaib Eng - Self     Patient Outreach    Pt in ED 12/12/22 for sprain of costal cartilage. Pt reports improvement of symptoms with medication use; education on effective breathing pattern/exercise provided to prevent pneumonia; understanding voiced. No questions/concerns voiced at present; ACM will monitor to close/outreach.    SDOH updated and reviewed with the patient during this program:  Transportation Needs: No Transportation Needs   • Lack of Transportation (Medical): No   • Lack of Transportation (Non-Medical): No       Education Documentation  Unresolved/Worsening Symptoms, taught by Charley Fierro, RN at 12/16/2022  2:06 PM.  Learner: Patient  Readiness: Acceptance  Method: Explanation  Response: Verbalizes Understanding    Signs/Symptoms, taught by Charley Fierro, RN at 12/16/2022  2:06 PM.  Learner: Patient  Readiness: Acceptance  Method: Explanation  Response: Verbalizes Understanding          CHARLEY VARELA  Ambulatory Case Management    12/16/2022, 14:07 EST

## 2023-01-16 ENCOUNTER — PATIENT OUTREACH (OUTPATIENT)
Dept: CASE MANAGEMENT | Facility: OTHER | Age: 43
End: 2023-01-16
Payer: MEDICARE

## 2023-01-16 NOTE — OUTREACH NOTE
AMBULATORY CASE MANAGEMENT NOTE    Name and Relationship of Patient/Support Person:  -     Care Coordination    Chart review completed; no acute events noted. Pt has not outreached for HRCM; ACM will close program for this episode.        CHARLEY VARELA  Ambulatory Case Management    1/16/2023, 15:44 EST

## 2023-01-23 NOTE — PROGRESS NOTES
"    Cardiology Outpatient Visit      Identification: Shoaib Eng is a 42 y.o. male who resides in [place], [place]    Reason for visit:  Coronary Artery Disease (/)    Assessment     Problem List Items Addressed This Visit        Cardiac and Vasculature    Coronary artery disease involving native coronary artery of native heart with angina pectoris (HCC) - Primary    Overview     · Echocardiogram (5/3/2022): LVEF 65%. Moderate concentric hypertrophy.  · Nuclear stress test (5/4/2022): No evidence of ischemia. LVEF 64%.   · CCS class II angina  · CT coronary angiogram (10/7/2022): Possible  of left circumflex.  Moderate proximal LAD disease.  · Cardiac catheterization (10/13/2022): Severe 1-vessel CAD involving chronic total occlusion of the left circumflex.  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  If has further anginal episodes he will contact us and we will consider increasing isosorbide at that time.  · Sublingual nitroglycerin as needed for episodes of angina         Hyperlipidemia LDL goal <70    Overview     • High intensity statin therapy indicated given the presence of CAD         Current Assessment & Plan     · Continue Crestor 20 mg  · Obtain CMP and lipid profile            Symptoms and Signs    Situational syncope    Overview     · Episode of syncope after defecation, 4/2022  · Echo (5/3/2022): LVEF 62%.  Normal valves.         Current Assessment & Plan     · No further syncopal spell            Tobacco    Tobacco use    Current Assessment & Plan     · Recommend immediate smoking cessation            Plan   • Immediate smoking cessation.  Should not use nicotine patch and smoke simultaneously  • Discussed increasing isosorbide but patient wants to \"wait\".  He will let us know if he has any further exertional chest pain  • Continue aspirin and " "statin therapy  • Obtain CMP and lipid profile today      Follow-up   Return in about 6 months (around 7/25/2023), or if symptoms worsen or fail to improve, for Follow-up with Dr. Aj next visit.        Subjective      Patient is a 42-year-old gentleman who returns today for follow-up of his coronary artery disease and cardiac risk factors. The patient was hospitalized [facility] in October for CCS class II symptoms.  He underwent stress testing that was normal.  He continued to have persistent symptoms and based on symptomology and family history he underwent CT coronary angiogram.  Study showed abnormality of the left circumflex as well as moderate disease in LAD.  To confirm his coronary anatomy a cardiac catheterization was performed on 10/13/2022.  He was found to have a severe one-vessel CAD involving a chronic occlusion of an impairment left circumflex coronary artery.  Findings were consistent with a CT coronary angiogram.  The ostium of the anomalous vessel was not demonstrated on angiography however.  PCI was considered to be extremely difficult due to lack of definition of the ostium of the occlusion and anomalous nature and medical therapy instead of intervention was recommended.  He was started on aspirin, antianginals and statin therapy.  Since then he has been doing well.  He says since starting the new medications he feels better than he has in a long time.  He did have an episode of chest pain several weeks ago while he was having \"sex\" with his wife.  He stopped use 1 sublingual nitroglycerin and his symptoms resolved.  They have had sex during other occasions since his heart cath and he has had no chest discomfort.  He has decreased the amount of cigarettes he smokes.  He is using a nicotine patch and Wellbutrin but still smokes 3 to 4 cigarettes/day.  He has a history of a syncopal episode that occurred after having a bowel movement last year.  He has had no further syncope " "since.    Review of Systems   Constitutional: Negative for malaise/fatigue.   Eyes: Negative for vision loss in left eye and vision loss in right eye.   Cardiovascular: Positive for chest pain. Negative for dyspnea on exertion, near-syncope, orthopnea, palpitations, paroxysmal nocturnal dyspnea and syncope.   Musculoskeletal: Negative for myalgias.   Neurological: Negative for brief paralysis, excessive daytime sleepiness, focal weakness, numbness, paresthesias and weakness.   All other systems reviewed and are negative.      Current Outpatient Medications   Medication Instructions   • ALPRAZolam (XANAX) 0.25 MG tablet 1 tablet, Oral, Daily   • aspirin 81 mg, Oral, Daily   • gabapentin (NEURONTIN) 600 MG tablet Oral, 3 Times Daily   • HYDROcodone-acetaminophen (NORCO) 7.5-325 MG per tablet 1 tablet, Oral, Every 6 Hours PRN   • isosorbide mononitrate (IMDUR) 30 mg, Oral, Every Morning   • nicotine (Nicoderm CQ) 14 MG/24HR patch 1 patch, Transdermal, Every 24 Hours   • nitroglycerin (NITROSTAT) 0.4 mg, Sublingual, Every 5 Minutes PRN, Take no more than 3 doses in 15 minutes.   • omeprazole (PRILOSEC) 40 mg, Oral, Daily   • rosuvastatin (CRESTOR) 20 mg, Oral, Daily       Objective     /72 (BP Location: Right arm, Patient Position: Sitting)   Pulse 78   Ht 180.3 cm (71\")   Wt 106 kg (234 lb)   SpO2 98%   BMI 32.64 kg/m²       Constitutional:       Appearance: Healthy appearance. Well-developed.   Eyes:      General: Lids are normal. No scleral icterus.     Conjunctiva/sclera: Conjunctivae normal.   HENT:      Head: Normocephalic and atraumatic.   Neck:      Thyroid: No thyromegaly.      Vascular: No carotid bruit or JVD.   Pulmonary:      Effort: Pulmonary effort is normal.      Breath sounds: Normal breath sounds. No wheezing. No rhonchi. No rales.   Cardiovascular:      Normal rate. Regular rhythm.      Murmurs: There is no murmur.      No gallop. No rub.   Pulses:     Intact distal pulses.   Edema:     " Peripheral edema absent.   Abdominal:      General: There is no distension.      Palpations: Abdomen is soft. There is no abdominal mass.   Musculoskeletal:      Cervical back: Normal range of motion. Skin:     General: Skin is warm and dry.      Findings: No rash.   Neurological:      General: No focal deficit present.      Mental Status: Alert and oriented to person, place, and time.      Gait: Gait is intact.   Psychiatric:         Attention and Perception: Attention normal.         Mood and Affect: Mood normal.         Behavior: Behavior normal.         Result Review  (reviewed with patient):            Lab Results   Component Value Date    GLUCOSE 100 (H) 10/13/2022    BUN 7 10/13/2022    CREATININE 0.80 10/13/2022    EGFRIFNONA 110 08/29/2017    BCR 8.1 10/13/2022    K 4.3 10/13/2022    CO2 23.0 10/13/2022    CALCIUM 9.2 10/13/2022    ALBUMIN 4.30 10/13/2022    AST 15 10/13/2022    ALT 20 10/13/2022     Lab Results   Component Value Date    WBC 10.93 (H) 10/13/2022    HGB 15.5 10/13/2022    HCT 43.8 10/13/2022    MCV 92.0 10/13/2022     10/13/2022     Lab Results   Component Value Date    CHOL 193 10/13/2022    TRIG 245 (H) 10/13/2022    HDL 22 (L) 10/13/2022     (H) 10/13/2022     Lab Results   Component Value Date    HGBA1C 5.50 10/13/2022       Advance Care Planning   ACP discussion was held with the patient during this visit. Patient does not have an advance directive, information provided.     Erica Keller, EJ  1/25/2023

## 2023-01-25 ENCOUNTER — OFFICE VISIT (OUTPATIENT)
Dept: CARDIOLOGY | Facility: CLINIC | Age: 43
End: 2023-01-25
Payer: MEDICARE

## 2023-01-25 ENCOUNTER — LAB (OUTPATIENT)
Dept: LAB | Facility: HOSPITAL | Age: 43
End: 2023-01-25
Payer: MEDICARE

## 2023-01-25 VITALS
SYSTOLIC BLOOD PRESSURE: 124 MMHG | OXYGEN SATURATION: 98 % | HEIGHT: 71 IN | HEART RATE: 78 BPM | BODY MASS INDEX: 32.76 KG/M2 | DIASTOLIC BLOOD PRESSURE: 72 MMHG | WEIGHT: 234 LBS

## 2023-01-25 DIAGNOSIS — R55 SITUATIONAL SYNCOPE: ICD-10-CM

## 2023-01-25 DIAGNOSIS — E78.5 HYPERLIPIDEMIA LDL GOAL <70: ICD-10-CM

## 2023-01-25 DIAGNOSIS — I25.119 CORONARY ARTERY DISEASE INVOLVING NATIVE CORONARY ARTERY OF NATIVE HEART WITH ANGINA PECTORIS: Primary | ICD-10-CM

## 2023-01-25 DIAGNOSIS — Z72.0 TOBACCO USE: ICD-10-CM

## 2023-01-25 LAB
ALBUMIN SERPL-MCNC: 4.4 G/DL (ref 3.5–5.2)
ALBUMIN/GLOB SERPL: 1.4 G/DL
ALP SERPL-CCNC: 93 U/L (ref 39–117)
ALT SERPL W P-5'-P-CCNC: 20 U/L (ref 1–41)
ANION GAP SERPL CALCULATED.3IONS-SCNC: 12 MMOL/L (ref 5–15)
AST SERPL-CCNC: 17 U/L (ref 1–40)
BILIRUB SERPL-MCNC: 0.4 MG/DL (ref 0–1.2)
BUN SERPL-MCNC: 8 MG/DL (ref 6–20)
BUN/CREAT SERPL: 11.6 (ref 7–25)
CALCIUM SPEC-SCNC: 9.1 MG/DL (ref 8.6–10.5)
CHLORIDE SERPL-SCNC: 104 MMOL/L (ref 98–107)
CHOLEST SERPL-MCNC: 153 MG/DL (ref 0–200)
CO2 SERPL-SCNC: 23 MMOL/L (ref 22–29)
CREAT SERPL-MCNC: 0.69 MG/DL (ref 0.76–1.27)
EGFRCR SERPLBLD CKD-EPI 2021: 118.5 ML/MIN/1.73
GLOBULIN UR ELPH-MCNC: 3.2 GM/DL
GLUCOSE SERPL-MCNC: 93 MG/DL (ref 65–99)
HDLC SERPL-MCNC: 25 MG/DL (ref 40–60)
LDLC SERPL CALC-MCNC: 94 MG/DL (ref 0–100)
LDLC/HDLC SERPL: 3.54 {RATIO}
POTASSIUM SERPL-SCNC: 4.4 MMOL/L (ref 3.5–5.2)
PROT SERPL-MCNC: 7.6 G/DL (ref 6–8.5)
SODIUM SERPL-SCNC: 139 MMOL/L (ref 136–145)
TRIGL SERPL-MCNC: 198 MG/DL (ref 0–150)
VLDLC SERPL-MCNC: 34 MG/DL (ref 5–40)

## 2023-01-25 PROCEDURE — 80053 COMPREHEN METABOLIC PANEL: CPT

## 2023-01-25 PROCEDURE — 99214 OFFICE O/P EST MOD 30 MIN: CPT | Performed by: NURSE PRACTITIONER

## 2023-01-25 PROCEDURE — 80061 LIPID PANEL: CPT

## 2023-01-25 PROCEDURE — 36415 COLL VENOUS BLD VENIPUNCTURE: CPT

## 2023-01-25 NOTE — ASSESSMENT & PLAN NOTE
· Continue aspirin 81 mg daily  · Continue isosorbide 30 mg daily.  If has further anginal episodes he will contact us and we will consider increasing isosorbide at that time.  · Sublingual nitroglycerin as needed for episodes of angina

## 2023-02-06 ENCOUNTER — TELEPHONE (OUTPATIENT)
Dept: CARDIOLOGY | Facility: CLINIC | Age: 43
End: 2023-02-06
Payer: MEDICARE

## 2023-02-06 DIAGNOSIS — E78.5 HYPERLIPIDEMIA LDL GOAL <70: ICD-10-CM

## 2023-02-06 RX ORDER — ROSUVASTATIN CALCIUM 40 MG/1
40 TABLET, COATED ORAL DAILY
Qty: 90 TABLET | Refills: 0 | Status: SHIPPED | OUTPATIENT
Start: 2023-02-06

## 2023-02-06 NOTE — TELEPHONE ENCOUNTER
----- Message from Walter Aj IV, MD sent at 2/6/2023  8:43 AM EST -----  Increase rosuvastatin 40 mg nightly.  CMP and lipids in 6-weeks

## 2023-03-24 ENCOUNTER — HOSPITAL ENCOUNTER (OUTPATIENT)
Dept: GENERAL RADIOLOGY | Facility: HOSPITAL | Age: 43
Discharge: HOME | End: 2023-03-24
Payer: MEDICARE

## 2023-03-24 ENCOUNTER — TRANSCRIBE ORDERS (OUTPATIENT)
Dept: ADMINISTRATIVE | Facility: HOSPITAL | Age: 43
End: 2023-03-24
Payer: MEDICARE

## 2023-03-24 DIAGNOSIS — M25.532 LEFT WRIST PAIN: Primary | ICD-10-CM

## 2023-03-24 PROCEDURE — 73120 X-RAY EXAM OF HAND: CPT | Performed by: RADIOLOGY

## 2023-03-24 PROCEDURE — 73100 X-RAY EXAM OF WRIST: CPT

## 2023-03-24 PROCEDURE — 73120 X-RAY EXAM OF HAND: CPT

## 2023-03-24 PROCEDURE — 73100 X-RAY EXAM OF WRIST: CPT | Performed by: RADIOLOGY

## 2023-08-14 ENCOUNTER — TELEPHONE (OUTPATIENT)
Dept: CARDIOLOGY | Facility: CLINIC | Age: 43
End: 2023-08-14

## 2023-08-14 DIAGNOSIS — I10 ESSENTIAL HYPERTENSION: ICD-10-CM

## 2023-08-14 DIAGNOSIS — I25.119 CORONARY ARTERY DISEASE INVOLVING NATIVE CORONARY ARTERY OF NATIVE HEART WITH ANGINA PECTORIS: Primary | ICD-10-CM

## 2023-08-14 NOTE — TELEPHONE ENCOUNTER
Caller: Nicki Eng    Relationship to patient: Emergency Contact    Best call back number: [phone]    Chief complaint: PATIENT CONCERNED WITH HIGH BLOOD PRESSURE. WAS ADVISED BY PCP TO FOLLOW UP WITH CARDIOLOGIST.     Type of visit: FOLLOW UP     Requested date: ANY

## 2023-08-14 NOTE — TELEPHONE ENCOUNTER
Spoke with patient's wife to obtain BP readings. They are as follows:    8/4/23 168/92  8/8/23 174/101  8/11/23 162/88    He is taking Isosorbide 30mg daily  Metoprolol succinate 25mg daily    He is having headaches as well and pain in the back of his head with exertion. This also happens during intercourse. States it feels like someone has hit him in the back of the head.

## 2023-08-15 RX ORDER — LOSARTAN POTASSIUM 25 MG/1
25 TABLET ORAL DAILY
Qty: 30 TABLET | Refills: 1 | Status: SHIPPED | OUTPATIENT
Start: 2023-08-15

## 2023-08-15 NOTE — TELEPHONE ENCOUNTER
Spoke with wife. She verbalized understanding on starting losartan 25 mg daily and BMP in 1 week.

## 2024-01-08 ENCOUNTER — HOSPITAL ENCOUNTER (OUTPATIENT)
Facility: HOSPITAL | Age: 44
Discharge: HOME | End: 2024-01-08
Payer: MEDICARE

## 2024-01-08 ENCOUNTER — TRANSCRIBE ORDERS (OUTPATIENT)
Dept: ADMINISTRATIVE | Facility: HOSPITAL | Age: 44
End: 2024-01-08
Payer: MEDICARE

## 2024-01-08 DIAGNOSIS — M54.50 LOW BACK PAIN, UNSPECIFIED BACK PAIN LATERALITY, UNSPECIFIED CHRONICITY, UNSPECIFIED WHETHER SCIATICA PRESENT: Primary | ICD-10-CM

## 2024-01-08 DIAGNOSIS — M54.50 LOW BACK PAIN, UNSPECIFIED BACK PAIN LATERALITY, UNSPECIFIED CHRONICITY, UNSPECIFIED WHETHER SCIATICA PRESENT: ICD-10-CM

## 2024-02-05 ENCOUNTER — TRANSCRIBE ORDERS (OUTPATIENT)
Dept: ADMINISTRATIVE | Facility: HOSPITAL | Age: 44
End: 2024-02-05
Payer: MEDICARE

## 2024-02-05 ENCOUNTER — HOSPITAL ENCOUNTER (OUTPATIENT)
Facility: HOSPITAL | Age: 44
Discharge: HOME | End: 2024-02-05
Admitting: ANESTHESIOLOGY
Payer: MEDICARE

## 2024-02-05 DIAGNOSIS — M54.50 LOW BACK PAIN, UNSPECIFIED BACK PAIN LATERALITY, UNSPECIFIED CHRONICITY, UNSPECIFIED WHETHER SCIATICA PRESENT: ICD-10-CM

## 2024-02-05 DIAGNOSIS — M54.50 LOW BACK PAIN, UNSPECIFIED BACK PAIN LATERALITY, UNSPECIFIED CHRONICITY, UNSPECIFIED WHETHER SCIATICA PRESENT: Primary | ICD-10-CM

## 2024-02-05 PROCEDURE — 72100 X-RAY EXAM L-S SPINE 2/3 VWS: CPT

## 2024-02-13 ENCOUNTER — TRANSCRIBE ORDERS (OUTPATIENT)
Dept: ADMINISTRATIVE | Facility: HOSPITAL | Age: 44
End: 2024-02-13
Payer: MEDICARE

## 2024-02-13 DIAGNOSIS — M54.16 RADICULOPATHY, LUMBAR REGION: Primary | ICD-10-CM

## 2024-02-22 ENCOUNTER — HOSPITAL ENCOUNTER (OUTPATIENT)
Dept: MRI IMAGING | Facility: HOSPITAL | Age: 44
Discharge: HOME | End: 2024-02-22
Admitting: PHYSICIAN ASSISTANT
Payer: MEDICARE

## 2024-02-22 DIAGNOSIS — M54.16 RADICULOPATHY, LUMBAR REGION: ICD-10-CM

## 2024-02-22 PROCEDURE — 72148 MRI LUMBAR SPINE W/O DYE: CPT

## 2024-02-22 PROCEDURE — 72148 MRI LUMBAR SPINE W/O DYE: CPT | Performed by: RADIOLOGY

## 2024-04-12 NOTE — PROGRESS NOTES
"    Cardiology Outpatient Visit      Identification: Shoaib Eng is a 43 y.o. male who resides in [place], [place].     Reason for visit:  CAD with angina  Hyperlipidemia      Subjective      Shoaib Carpenter returns to the office today for 1 year follow-up.  His daughter was hospitalized in January for influenza.  She had complications related to placement of an adult sized ET tube and now has issues with tracheal damage.  The daughter has been home for a total of 13 days since New Year's.  This is resulted in significant amount of stress in his life.  He does experience substernal chest discomfort with stress.  He has taken sublingual nitroglycerin at times with relief.  He denies exertional anginal symptoms.    Review of Systems   Cardiovascular:  Positive for chest pain.       Allergies   Allergen Reactions    Hurricaine-Dry Handle [Benzocaine] Other (See Comments)     Stops breathing and turns gray    Ativan [Lorazepam]     Chantix [Varenicline] Hallucinations    Penicillins          Current Outpatient Medications   Medication Instructions    ALPRAZolam (XANAX) 0.25 MG tablet 1 tablet, Oral, Daily    aspirin 81 mg, Oral, Daily    gabapentin (NEURONTIN) 600 MG tablet Oral, 3 Times Daily    HYDROcodone-acetaminophen (NORCO) 7.5-325 MG per tablet 1 tablet, Oral, Every 6 Hours PRN    isosorbide mononitrate (IMDUR) 30 mg, Oral, Every Morning    losartan (COZAAR) 25 mg, Oral, Daily    nicotine (Nicoderm CQ) 14 MG/24HR patch 1 patch, Transdermal, Every 24 Hours    nitroglycerin (NITROSTAT) 0.4 mg, Sublingual, Every 5 Minutes PRN, Take no more than 3 doses in 15 minutes.    omeprazole (PRILOSEC) 40 mg, Oral, Daily    rosuvastatin (CRESTOR) 40 mg, Oral, Daily         Objective     /60   Pulse 84   Ht 175.3 cm (69\")   Wt 107 kg (236 lb 3.2 oz)   SpO2 98%   BMI 34.88 kg/m²       Constitutional:       Appearance: Healthy appearance.   Eyes:      General: No scleral icterus.  Neck:      Thyroid: No thyroid mass. "      Vascular: No carotid bruit or JVD. JVD normal.   Pulmonary:      Effort: Pulmonary effort is normal.      Breath sounds: Normal breath sounds.   Cardiovascular:      Normal rate. Regular rhythm.      Murmurs: There is no murmur.      No gallop.    Edema:     Peripheral edema absent.   Skin:     General: Skin is warm. There is no cyanosis.   Neurological:      General: No focal deficit present.      Mental Status: Alert.   Psychiatric:         Attention and Perception: Attention normal.         Result Review  (reviewed with patient):        ECG 12 Lead    Date/Time: 4/16/2024 11:24 AM  Performed by: Walter Aj IV, MD    Authorized by: Walter Aj IV, MD  Comparison: compared with previous ECG from 8/16/2022  Similar to previous ECG  Rhythm: sinus rhythm  BPM: 84    Clinical impression: abnormal EKG  Comments: QTc interval 482           Lab Results   Component Value Date    GLUCOSE 93 01/25/2023    BUN 8 01/25/2023    CREATININE 0.69 (L) 01/25/2023    EGFR 118.5 01/25/2023    BCR 11.6 01/25/2023    K 4.4 01/25/2023    CO2 23.0 01/25/2023    CALCIUM 9.1 01/25/2023    ALBUMIN 4.4 01/25/2023    BILITOT 0.4 01/25/2023    AST 17 01/25/2023    ALT 20 01/25/2023     Lab Results   Component Value Date    WBC 10.93 (H) 10/13/2022    HGB 15.5 10/13/2022    HCT 43.8 10/13/2022    MCV 92.0 10/13/2022     10/13/2022     Lab Results   Component Value Date    CHOL 153 01/25/2023    TRIG 198 (H) 01/25/2023    HDL 25 (L) 01/25/2023    LDL 94 01/25/2023     Lab Results   Component Value Date    HGBA1C 5.50 10/13/2022           Assessment     Diagnoses and all orders for this visit:    1. Coronary artery disease involving native coronary artery of native heart with angina pectoris (Primary)  Overview:  Echocardiogram (5/3/2022): LVEF 65%. Moderate concentric hypertrophy.  Nuclear stress test (5/4/2022): No evidence of ischemia. LVEF 64%.   CCS class II angina  CT coronary angiogram (10/7/2022):  Possible  of left circumflex.  Moderate proximal LAD disease.  Cardiac catheterization (10/13/2022): Severe 1-vessel CAD involving chronic total occlusion of the left circumflex.  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  Dispense: 90 tablet; Refill: 3  -     aspirin 81 MG EC tablet; Take 1 tablet by mouth Daily.  Dispense: 90 tablet; Refill: 3  -     nitroglycerin (NITROSTAT) 0.4 MG SL tablet; Place 1 tablet under the tongue Every 5 (Five) Minutes As Needed for Chest Pain. Take no more than 3 doses in 15 minutes.  Dispense: 25 tablet; Refill: 5  -     CBC (No Diff); Future    2. Hyperlipidemia LDL goal <70  Overview:  High intensity statin therapy indicated given the presence of CAD    Assessment & Plan:  Obtain direct LDL and CMP today  If LDL >70, will refer to [facility] pharmacy for initiation of antilipid injections    Orders:  -     rosuvastatin (CRESTOR) 40 MG tablet; Take 1 tablet by mouth Daily.  Dispense: 90 tablet; Refill: 3  -     Comprehensive Metabolic Panel; Future  -     LDL Cholesterol, Direct; Future    3. Tobacco use  Assessment & Plan:  Presently not in a situation to quit smoking given high stress related to daughter's illness      4. Essential hypertension  -     losartan (COZAAR) 25 MG tablet; Take 1 tablet by mouth Daily.  Dispense: 90 tablet; Refill: 3    Other orders  -     ECG 12 Lead          Plan   Obtain CMP, direct LDL, CBC today  If LDL >70, refer to [facility] disease management clinic for hyperlipidemia  Sublingual nitroglycerin prescribed with instructions on use      Follow-up    Return in about 1 year (around 4/16/2025).        Surinder Aj MD, [facility]  4/16/2024

## 2024-04-16 ENCOUNTER — LAB (OUTPATIENT)
Dept: LAB | Facility: HOSPITAL | Age: 44
End: 2024-04-16
Payer: MEDICARE

## 2024-04-16 ENCOUNTER — OFFICE VISIT (OUTPATIENT)
Dept: CARDIOLOGY | Facility: CLINIC | Age: 44
End: 2024-04-16
Payer: MEDICARE

## 2024-04-16 VITALS
DIASTOLIC BLOOD PRESSURE: 60 MMHG | SYSTOLIC BLOOD PRESSURE: 130 MMHG | BODY MASS INDEX: 34.98 KG/M2 | HEART RATE: 84 BPM | HEIGHT: 69 IN | WEIGHT: 236.2 LBS | OXYGEN SATURATION: 98 %

## 2024-04-16 DIAGNOSIS — I10 ESSENTIAL HYPERTENSION: ICD-10-CM

## 2024-04-16 DIAGNOSIS — E78.5 HYPERLIPIDEMIA LDL GOAL <70: ICD-10-CM

## 2024-04-16 DIAGNOSIS — Z72.0 TOBACCO USE: ICD-10-CM

## 2024-04-16 DIAGNOSIS — I25.119 CORONARY ARTERY DISEASE INVOLVING NATIVE CORONARY ARTERY OF NATIVE HEART WITH ANGINA PECTORIS: Primary | ICD-10-CM

## 2024-04-16 DIAGNOSIS — I25.119 CORONARY ARTERY DISEASE INVOLVING NATIVE CORONARY ARTERY OF NATIVE HEART WITH ANGINA PECTORIS: ICD-10-CM

## 2024-04-16 LAB
ALBUMIN SERPL-MCNC: 4.4 G/DL (ref 3.5–5.2)
ALBUMIN/GLOB SERPL: 1.4 G/DL
ALP SERPL-CCNC: 88 U/L (ref 39–117)
ALT SERPL W P-5'-P-CCNC: 25 U/L (ref 1–41)
ANION GAP SERPL CALCULATED.3IONS-SCNC: 17 MMOL/L (ref 5–15)
ARTICHOKE IGE QN: 110 MG/DL (ref 0–100)
AST SERPL-CCNC: 16 U/L (ref 1–40)
BILIRUB SERPL-MCNC: 0.2 MG/DL (ref 0–1.2)
BUN SERPL-MCNC: 7 MG/DL (ref 6–20)
BUN/CREAT SERPL: 7.4 (ref 7–25)
CALCIUM SPEC-SCNC: 9.5 MG/DL (ref 8.6–10.5)
CHLORIDE SERPL-SCNC: 106 MMOL/L (ref 98–107)
CO2 SERPL-SCNC: 26 MMOL/L (ref 22–29)
CREAT SERPL-MCNC: 0.94 MG/DL (ref 0.76–1.27)
DEPRECATED RDW RBC AUTO: 38.9 FL (ref 37–54)
EGFRCR SERPLBLD CKD-EPI 2021: 103.2 ML/MIN/1.73
ERYTHROCYTE [DISTWIDTH] IN BLOOD BY AUTOMATED COUNT: 11.8 % (ref 12.3–15.4)
GLOBULIN UR ELPH-MCNC: 3.2 GM/DL
GLUCOSE SERPL-MCNC: 91 MG/DL (ref 65–99)
HCT VFR BLD AUTO: 45.5 % (ref 37.5–51)
HGB BLD-MCNC: 15.6 G/DL (ref 13–17.7)
MCH RBC QN AUTO: 31 PG (ref 26.6–33)
MCHC RBC AUTO-ENTMCNC: 34.3 G/DL (ref 31.5–35.7)
MCV RBC AUTO: 90.5 FL (ref 79–97)
PLATELET # BLD AUTO: 233 10*3/MM3 (ref 140–450)
PMV BLD AUTO: 10.8 FL (ref 6–12)
POTASSIUM SERPL-SCNC: 3.9 MMOL/L (ref 3.5–5.2)
PROT SERPL-MCNC: 7.6 G/DL (ref 6–8.5)
RBC # BLD AUTO: 5.03 10*6/MM3 (ref 4.14–5.8)
SODIUM SERPL-SCNC: 149 MMOL/L (ref 136–145)
WBC NRBC COR # BLD AUTO: 10.16 10*3/MM3 (ref 3.4–10.8)

## 2024-04-16 PROCEDURE — 36415 COLL VENOUS BLD VENIPUNCTURE: CPT

## 2024-04-16 PROCEDURE — 1160F RVW MEDS BY RX/DR IN RCRD: CPT | Performed by: INTERNAL MEDICINE

## 2024-04-16 PROCEDURE — 99214 OFFICE O/P EST MOD 30 MIN: CPT | Performed by: INTERNAL MEDICINE

## 2024-04-16 PROCEDURE — 93000 ELECTROCARDIOGRAM COMPLETE: CPT | Performed by: INTERNAL MEDICINE

## 2024-04-16 PROCEDURE — 85027 COMPLETE CBC AUTOMATED: CPT

## 2024-04-16 PROCEDURE — 83721 ASSAY OF BLOOD LIPOPROTEIN: CPT

## 2024-04-16 PROCEDURE — 1159F MED LIST DOCD IN RCRD: CPT | Performed by: INTERNAL MEDICINE

## 2024-04-16 PROCEDURE — 80053 COMPREHEN METABOLIC PANEL: CPT

## 2024-04-16 RX ORDER — NITROGLYCERIN 0.4 MG/1
0.4 TABLET SUBLINGUAL
Qty: 25 TABLET | Refills: 5 | Status: SHIPPED | OUTPATIENT
Start: 2024-04-16

## 2024-04-16 RX ORDER — ASPIRIN 81 MG/1
81 TABLET ORAL DAILY
Qty: 90 TABLET | Refills: 3 | Status: SHIPPED | OUTPATIENT
Start: 2024-04-16

## 2024-04-16 RX ORDER — ISOSORBIDE MONONITRATE 30 MG/1
30 TABLET, EXTENDED RELEASE ORAL EVERY MORNING
Qty: 90 TABLET | Refills: 3 | Status: SHIPPED | OUTPATIENT
Start: 2024-04-16

## 2024-04-16 RX ORDER — LOSARTAN POTASSIUM 25 MG/1
25 TABLET ORAL DAILY
Qty: 90 TABLET | Refills: 3 | Status: SHIPPED | OUTPATIENT
Start: 2024-04-16

## 2024-04-16 RX ORDER — ROSUVASTATIN CALCIUM 40 MG/1
40 TABLET, COATED ORAL DAILY
Qty: 90 TABLET | Refills: 3 | Status: SHIPPED | OUTPATIENT
Start: 2024-04-16

## 2024-04-16 NOTE — ASSESSMENT & PLAN NOTE
Atypical anginal symptoms in the setting of situational stress  Continue aspirin, long-acting nitrate  Continue sublingual nitroglycerin as needed  Patient advised to contact us if symptoms become exertional

## 2024-04-16 NOTE — ASSESSMENT & PLAN NOTE
Obtain direct LDL and CMP today  If LDL >70, will refer to [facility] pharmacy for initiation of antilipid injections

## 2024-04-17 ENCOUNTER — TELEPHONE (OUTPATIENT)
Dept: CARDIOLOGY | Facility: CLINIC | Age: 44
End: 2024-04-17
Payer: MEDICARE

## 2024-04-17 DIAGNOSIS — I25.119 CORONARY ARTERY DISEASE INVOLVING NATIVE CORONARY ARTERY OF NATIVE HEART WITH ANGINA PECTORIS: Primary | ICD-10-CM

## 2024-04-17 DIAGNOSIS — E78.5 HYPERLIPIDEMIA LDL GOAL <70: ICD-10-CM

## 2024-04-17 NOTE — TELEPHONE ENCOUNTER
----- Message from Walter Aj IV, MD sent at 4/17/2024 10:49 AM EDT -----  Refer to [facility] pharmacy for Repatha

## 2024-04-23 ENCOUNTER — SPECIALTY PHARMACY (OUTPATIENT)
Dept: PHARMACY | Facility: HOSPITAL | Age: 44
End: 2024-04-23
Payer: MEDICARE

## 2024-04-23 ENCOUNTER — DISEASE STATE MANAGEMENT VISIT (OUTPATIENT)
Dept: PHARMACY | Facility: HOSPITAL | Age: 44
End: 2024-04-23
Payer: MEDICARE

## 2024-04-23 DIAGNOSIS — E78.5 HYPERLIPIDEMIA LDL GOAL <70: ICD-10-CM

## 2024-04-23 DIAGNOSIS — I25.119 CORONARY ARTERY DISEASE INVOLVING NATIVE CORONARY ARTERY OF NATIVE HEART WITH ANGINA PECTORIS: Primary | ICD-10-CM

## 2024-04-23 RX ORDER — ALIROCUMAB 75 MG/ML
75 INJECTION, SOLUTION SUBCUTANEOUS
Qty: 2 ML | Refills: 5 | Status: SHIPPED | OUTPATIENT
Start: 2024-04-23

## 2024-04-23 NOTE — PROGRESS NOTES
Medication Management Clinic  Lipid Management Program - PCSK9i       Shoaib Eng is a 43 y.o. male referred to the Medication Management Clinic by Dr. Aj for clinical pharmacy and specialty pharmacy management of PCSK9i.  Shoaib Eng is treated for clinical ASCVD and hyperlipidemia and currently takes atorvastatin 40 mg daily and LDL is not at goal.  In the past, Pt has tried atorvastatin. The patient denies any allergies to latex.      Relevant Past Medical History and Comorbidities  Past Medical History:   Diagnosis Date    Anxiety     Family history of stress     History of echocardiogram     Hyperlipemia     Hyperlipidemia     Neuropathy      Social History     Socioeconomic History    Marital status:    Tobacco Use    Smoking status: Every Day     Current packs/day: 0.50     Types: Cigarettes    Smokeless tobacco: Never   Substance and Sexual Activity    Alcohol use: No    Drug use: No    Sexual activity: Defer       Allergies  Hurricaine-dry handle [benzocaine], Ativan [lorazepam], Chantix [varenicline], and Penicillins    Current Medication List    Current Outpatient Medications:     Alirocumab (Praluent) 75 MG/ML solution auto-injector, Inject 1 mL under the skin into the appropriate area as directed Every 14 (Fourteen) Days., Disp: 2 mL, Rfl: 5    ALPRAZolam (XANAX) 0.25 MG tablet, Take 1 tablet by mouth Daily., Disp: , Rfl:     aspirin 81 MG EC tablet, Take 1 tablet by mouth Daily., Disp: 90 tablet, Rfl: 3    gabapentin (NEURONTIN) 600 MG tablet, Take  by mouth 3 (Three) Times a Day., Disp: , Rfl:     HYDROcodone-acetaminophen (NORCO) 7.5-325 MG per tablet, Take 1 tablet by mouth Every 6 (Six) Hours As Needed for Moderate Pain., Disp: , Rfl:     isosorbide mononitrate (IMDUR) 30 MG 24 hr tablet, Take 1 tablet by mouth Every Morning., Disp: 90 tablet, Rfl: 3    losartan (COZAAR) 25 MG tablet, Take 1 tablet by mouth Daily., Disp: 90 tablet, Rfl: 3    nicotine (Nicoderm CQ) 14  MG/24HR patch, Place 1 patch on the skin as directed by provider Daily., Disp: 30 patch, Rfl: 3    nitroglycerin (NITROSTAT) 0.4 MG SL tablet, Place 1 tablet under the tongue Every 5 (Five) Minutes As Needed for Chest Pain. Take no more than 3 doses in 15 minutes., Disp: 25 tablet, Rfl: 5    omeprazole (priLOSEC) 40 MG capsule, Take 1 capsule by mouth Daily., Disp: , Rfl:     rosuvastatin (CRESTOR) 40 MG tablet, Take 1 tablet by mouth Daily., Disp: 90 tablet, Rfl: 3    Drug Interactions  None with Praluent    Relevant Laboratory Values  Lab Results   Component Value Date    CHOL 153 01/25/2023    TRIG 198 (H) 01/25/2023    HDL 25 (L) 01/25/2023     (H) 04/16/2024         Initial Education Provided for Praluent/Repatha    Patient seen in the Medication Management Clinic for initial education and injection training for PCSK9 inhibitors. The patient was introduced to services offered by [facility], including: regular assessments, refill coordination, curbside pick-up or mail order delivery options, prior authorization maintenance, and financial assistance programs as applicable. The patient was also provided with contact information for the pharmacy team. Welcome information and patient satisfaction survey to be sent by retail team with patient's initial fill.    Patient Instructions    Repatha/Praluent is used to lower LDL, or bad cholesterol, to help reduce your chance of a heart attack or stroke.  You should give your injection once every 14 days.  Your doctor will likely keep you on this medication indefinitely as long as it is working for you and not causing any adverse effects.      This medication should be kept in the refrigerator until you are ready to use it.  Once removed from the refrigerator, it is good at room temperature for 30 days.  Do not leave in your car or expose to extreme heat.  Do not shake or freeze.  Dispose the used syringe/device in a sharps container.     This  injection is a subcutaneous injection, which means just under the skin.  It is important to choose an area of your skin that is not tender, bruised, cut or has scars or stretch marks.  You can inject into your abdomen (except for 2 inches around your navel), your thigh or your upper arm.  Do not administer with other drugs.   Rotate injection sites each time you give the injection. Wash your hands prior to giving yourself an injection and use an alcohol wipe to clean the area of the injection and allow to dry prior to injecting.      If you miss a dose, take it as soon as you remember and resume the original schedule if it is within 7 days from the missed dose.  If an every 2 week dose is not administered within 7 days, wait until the next dose on the original schedule.  If a once-monthly dose is not administered within 7 days, administer the dose and start a new schedule based on this date.     Be sure to let your doctor or pharmacist know of any medication changes, including OTC and herbal supplements.     Adverse Effects  Reviewed with patient and education on management provided.     Sore Throat  Injection site pain  Itching or irritation   Flu-like symptoms  Signs of an allergic reaction     Immunizations  While there are no immunizations that you need to get specifically because you are on this drug, it is important to keep up with your recommended routine vaccinations.     Adherence and Self-Administration    Barriers to Patient Adherence and/or Self-Administration: None  Methods for Supporting Patient Adherence and/or Self-Administration: None Required     Goals of Therapy  Patient Goals of Therapy: To not miss any doses  Clinical Goals or Therapeutic Targets, If Applicable: LDL Reduction      Attestation  I attest that the initiated specialty medication(s) are appropriate for the patient based on my assessment.  If the prescribed therapy is at any point deemed not appropriate based on the current or future  assessments, a consultation will be initiated with the patient's specialty care provider to determine the best course of action. The revised plan of therapy will be documented along with any additional patient education provided.     The patient has been provided with the following education and any applicable administration techniques (i.e. self-injection) have been demonstrated for the therapies indicated. All questions and concerns have been addressed prior to the patient receiving the medication, and the patient has verbalized understanding of the education and any materials provided.  Additional patient education shall be provided and documented upon request by the patient, provider or payer.        Medication Assessment & Plan    Patient started today on Praluent 75 mg every 2 weeks. Injection training and medication education provided.     I administered Praluent in the back of right arm. Patient waited in clinic for 15 minutes following administration without any issues.    Patient will need lipid panel in 6 weeks, order placed.     Patient will continue regular follow-up with cardiology. Next appointment scheduled for 4/22/25.    The patient would like to receive specialty mail-out services for the next injection. Care Coordinator to set up future refill outreaches, coordinate prescription delivery, and escalate clinical questions to pharmacist.     Will follow-up in 6 months, or sooner if needed.     Tahmina Mora, [place]dal[place]  4/23/2024  13:23 EDT

## 2024-04-23 NOTE — PROGRESS NOTES
Medication Management Clinic  Lipid Management Program - PCSK9i       Shoaib Eng is a 43 y.o. male referred to the [facility]ication Management Clinic by Dr. Aj for clinical pharmacy and specialty pharmacy management of PCSK9i.  Shoaib Eng is treated for clinical ASCVD and hyperlipidemia and currently takes atorvastatin 40 mg daily and LDL is not at goal.  In the past, Pt has tried atorvastatin. The patient denies any allergies to latex.      Relevant Past Medical History and Comorbidities  Past Medical History:   Diagnosis Date    Anxiety     Family history of stress     History of echocardiogram     Hyperlipemia     Hyperlipidemia     Neuropathy      Social History     Socioeconomic History    Marital status:    Tobacco Use    Smoking status: Every Day     Current packs/day: 0.50     Types: Cigarettes    Smokeless tobacco: Never   Substance and Sexual Activity    Alcohol use: No    Drug use: No    Sexual activity: Defer       Allergies  Hurricaine-dry handle [benzocaine], Ativan [lorazepam], Chantix [varenicline], and Penicillins    Current Medication List    Current Outpatient Medications:     Alirocumab (Praluent) 75 MG/ML solution auto-injector, Inject 1 mL under the skin into the appropriate area as directed Every 14 (Fourteen) Days., Disp: 2 mL, Rfl: 5    ALPRAZolam (XANAX) 0.25 MG tablet, Take 1 tablet by mouth Daily., Disp: , Rfl:     aspirin 81 MG EC tablet, Take 1 tablet by mouth Daily., Disp: 90 tablet, Rfl: 3    gabapentin (NEURONTIN) 600 MG tablet, Take  by mouth 3 (Three) Times a Day., Disp: , Rfl:     HYDROcodone-acetaminophen (NORCO) 7.5-325 MG per tablet, Take 1 tablet by mouth Every 6 (Six) Hours As Needed for Moderate Pain., Disp: , Rfl:     isosorbide mononitrate (IMDUR) 30 MG 24 hr tablet, Take 1 tablet by mouth Every Morning., Disp: 90 tablet, Rfl: 3    losartan (COZAAR) 25 MG tablet, Take 1 tablet by mouth Daily., Disp: 90 tablet, Rfl: 3    nicotine (Nicoderm CQ) 14  MG/24HR patch, Place 1 patch on the skin as directed by provider Daily. (Patient not taking: Reported on 4/23/2024), Disp: 30 patch, Rfl: 3    nitroglycerin (NITROSTAT) 0.4 MG SL tablet, Place 1 tablet under the tongue Every 5 (Five) Minutes As Needed for Chest Pain. Take no more than 3 doses in 15 minutes. (Patient not taking: Reported on 4/23/2024), Disp: 25 tablet, Rfl: 5    omeprazole (priLOSEC) 40 MG capsule, Take 1 capsule by mouth Daily., Disp: , Rfl:     rosuvastatin (CRESTOR) 40 MG tablet, Take 1 tablet by mouth Daily., Disp: 90 tablet, Rfl: 3    Drug Interactions  None with Praluent    Relevant Laboratory Values  Lab Results   Component Value Date    CHOL 153 01/25/2023    TRIG 198 (H) 01/25/2023    HDL 25 (L) 01/25/2023     (H) 04/16/2024         Initial Education Provided for Praluent/Repatha    Patient seen in the Medication Management Clinic for initial education and injection training for PCSK9 inhibitors. The patient was introduced to services offered by [facility], including: regular assessments, refill coordination, curbside pick-up or mail order delivery options, prior authorization maintenance, and financial assistance programs as applicable. The patient was also provided with contact information for the pharmacy team. Welcome information and patient satisfaction survey to be sent by retail team with patient's initial fill.    Patient Instructions    Repatha/Praluent is used to lower LDL, or bad cholesterol, to help reduce your chance of a heart attack or stroke.  You should give your injection once every 14 days.  Your doctor will likely keep you on this medication indefinitely as long as it is working for you and not causing any adverse effects.      This medication should be kept in the refrigerator until you are ready to use it.  Once removed from the refrigerator, it is good at room temperature for 30 days.  Do not leave in your car or expose to extreme heat.   Do not shake or freeze.  Dispose the used syringe/device in a sharps container.     This injection is a subcutaneous injection, which means just under the skin.  It is important to choose an area of your skin that is not tender, bruised, cut or has scars or stretch marks.  You can inject into your abdomen (except for 2 inches around your navel), your thigh or your upper arm.  Do not administer with other drugs.   Rotate injection sites each time you give the injection. Wash your hands prior to giving yourself an injection and use an alcohol wipe to clean the area of the injection and allow to dry prior to injecting.      If you miss a dose, take it as soon as you remember and resume the original schedule if it is within 7 days from the missed dose.  If an every 2 week dose is not administered within 7 days, wait until the next dose on the original schedule.  If a once-monthly dose is not administered within 7 days, administer the dose and start a new schedule based on this date.     Be sure to let your doctor or pharmacist know of any medication changes, including OTC and herbal supplements.     Adverse Effects  Reviewed with patient and education on management provided.     Sore Throat  Injection site pain  Itching or irritation   Flu-like symptoms  Signs of an allergic reaction     Immunizations  While there are no immunizations that you need to get specifically because you are on this drug, it is important to keep up with your recommended routine vaccinations.     Adherence and Self-Administration    Barriers to Patient Adherence and/or Self-Administration: None  Methods for Supporting Patient Adherence and/or Self-Administration: None Required     Goals of Therapy  Patient Goals of Therapy: To not miss any doses  Clinical Goals or Therapeutic Targets, If Applicable: LDL Reduction      Attestation  I attest that the initiated specialty medication(s) are appropriate for the patient based on my assessment.  If the  prescribed therapy is at any point deemed not appropriate based on the current or future assessments, a consultation will be initiated with the patient's specialty care provider to determine the best course of action. The revised plan of therapy will be documented along with any additional patient education provided.     The patient has been provided with the following education and any applicable administration techniques (i.e. self-injection) have been demonstrated for the therapies indicated. All questions and concerns have been addressed prior to the patient receiving the medication, and the patient has verbalized understanding of the education and any materials provided.  Additional patient education shall be provided and documented upon request by the patient, provider or payer.        Medication Assessment & Plan    Patient started today on Praluent 75 mg every 2 weeks. Injection training and medication education provided.     I administered Praluent in the back of right arm. Patient waited in clinic for 15 minutes following administration without any issues.    Patient will need lipid panel in 6 weeks, order placed.     Patient will continue regular follow-up with cardiology. Next appointment scheduled for 4/22/25.    The patient would like to receive specialty mail-out services for the next injection. Care Coordinator to set up future refill outreaches, coordinate prescription delivery, and escalate clinical questions to pharmacist.     Will follow-up in 6 months, or sooner if needed.     Tahmina Mora, PharmAMBER  4/23/2024  13:51 EDT

## 2024-05-16 ENCOUNTER — SPECIALTY PHARMACY (OUTPATIENT)
Dept: PHARMACY | Facility: HOSPITAL | Age: 44
End: 2024-05-16
Payer: MEDICARE

## 2024-05-16 NOTE — PROGRESS NOTES
Specialty Pharmacy Refill Coordination Note     Shoaib is a 43 y.o. male contacted today regarding refills of  Praluent 75 MG/ML specialty medication(s).    Reviewed and verified with patient:       Specialty medication(s) and dose(s) confirmed: yes    Refill Questions      Flowsheet Row Most Recent Value   Changes to allergies? No   Changes to medications? No   New conditions or infections since last clinic visit No   Unplanned office visit, urgent care, ED, or hospital admission in the last 4 weeks  No   How does patient/caregiver feel medication is working? Very good   Financial problems or insurance changes  No   Since the previous refill, were any specialty medication doses or scheduled injections missed or delayed?  No   Does this patient require a clinical escalation to a pharmacist? No            Delivery Questions      Flowsheet Row Most Recent Value   Copay verified? Yes   Copay amount 0   Copay form of payment No copayment ($0)                   Follow-up: 84 day(s)     Brooke Hui, Pharmacy Technician  Specialty Pharmacy Technician

## 2024-07-18 ENCOUNTER — SPECIALTY PHARMACY (OUTPATIENT)
Dept: PHARMACY | Facility: HOSPITAL | Age: 44
End: 2024-07-18
Payer: MEDICARE

## 2024-07-18 NOTE — PROGRESS NOTES
Specialty Pharmacy Refill Coordination Note     Shoaib is a 43 y.o. male contacted today regarding refills of  Praluent 75 MG/ML specialty medication(s).    Reviewed and verified with patient:       Specialty medication(s) and dose(s) confirmed: yes    Refill Questions      Flowsheet Row Most Recent Value   Changes to allergies? No   Changes to medications? No   New conditions or infections since last clinic visit No   Unplanned office visit, urgent care, ED, or hospital admission in the last 4 weeks  No   How does patient/caregiver feel medication is working? Very good   Financial problems or insurance changes  No   Since the previous refill, were any specialty medication doses or scheduled injections missed or delayed?  No   Does this patient require a clinical escalation to a pharmacist? No            Delivery Questions      Flowsheet Row Most Recent Value   Copay verified? Yes   Copay amount 0   Copay form of payment No copayment ($0)                   Follow-up: 56 day(s)     Brooke Hui, Pharmacy Technician  Specialty Pharmacy Technician

## 2024-07-30 ENCOUNTER — APPOINTMENT (OUTPATIENT)
Dept: GENERAL RADIOLOGY | Facility: HOSPITAL | Age: 44
End: 2024-07-30
Payer: MEDICARE

## 2024-07-30 ENCOUNTER — HOSPITAL ENCOUNTER (EMERGENCY)
Facility: HOSPITAL | Age: 44
Discharge: HOME | End: 2024-07-31
Attending: STUDENT IN AN ORGANIZED HEALTH CARE EDUCATION/TRAINING PROGRAM
Payer: MEDICARE

## 2024-07-30 VITALS
SYSTOLIC BLOOD PRESSURE: 120 MMHG | BODY MASS INDEX: 34.07 KG/M2 | TEMPERATURE: 98.1 F | OXYGEN SATURATION: 97 % | RESPIRATION RATE: 18 BRPM | WEIGHT: 230 LBS | HEART RATE: 60 BPM | HEIGHT: 69 IN | DIASTOLIC BLOOD PRESSURE: 88 MMHG

## 2024-07-30 DIAGNOSIS — R07.81 RIB PAIN ON LEFT SIDE: Primary | ICD-10-CM

## 2024-07-30 LAB
HOLD SPECIMEN: NORMAL
HOLD SPECIMEN: NORMAL
WHOLE BLOOD HOLD COAG: NORMAL
WHOLE BLOOD HOLD SPECIMEN: NORMAL

## 2024-07-30 PROCEDURE — 99284 EMERGENCY DEPT VISIT MOD MDM: CPT

## 2024-07-30 PROCEDURE — 93005 ELECTROCARDIOGRAM TRACING: CPT | Performed by: STUDENT IN AN ORGANIZED HEALTH CARE EDUCATION/TRAINING PROGRAM

## 2024-07-30 PROCEDURE — 93010 ELECTROCARDIOGRAM REPORT: CPT | Performed by: INTERNAL MEDICINE

## 2024-07-30 PROCEDURE — 25010000002 KETOROLAC TROMETHAMINE PER 15 MG: Performed by: STUDENT IN AN ORGANIZED HEALTH CARE EDUCATION/TRAINING PROGRAM

## 2024-07-30 PROCEDURE — 25010000002 DEXAMETHASONE SODIUM PHOSPHATE 10 MG/ML SOLUTION: Performed by: STUDENT IN AN ORGANIZED HEALTH CARE EDUCATION/TRAINING PROGRAM

## 2024-07-30 PROCEDURE — 71111 X-RAY EXAM RIBS/CHEST4/> VWS: CPT

## 2024-07-30 PROCEDURE — 71111 X-RAY EXAM RIBS/CHEST4/> VWS: CPT | Performed by: RADIOLOGY

## 2024-07-30 PROCEDURE — 96372 THER/PROPH/DIAG INJ SC/IM: CPT

## 2024-07-30 RX ORDER — DEXAMETHASONE SODIUM PHOSPHATE 10 MG/ML
5 INJECTION, SOLUTION INTRAMUSCULAR; INTRAVENOUS ONCE
Status: COMPLETED | OUTPATIENT
Start: 2024-07-30 | End: 2024-07-30

## 2024-07-30 RX ORDER — KETOROLAC TROMETHAMINE 30 MG/ML
30 INJECTION, SOLUTION INTRAMUSCULAR; INTRAVENOUS ONCE
Status: COMPLETED | OUTPATIENT
Start: 2024-07-30 | End: 2024-07-30

## 2024-07-30 RX ADMIN — KETOROLAC TROMETHAMINE 30 MG: 60 INJECTION, SOLUTION INTRAMUSCULAR at 23:08

## 2024-07-30 RX ADMIN — DEXAMETHASONE SODIUM PHOSPHATE 5 MG: 10 INJECTION INTRAMUSCULAR; INTRAVENOUS at 23:08

## 2024-07-31 LAB
QT INTERVAL: 444 MS
QTC INTERVAL: 435 MS

## 2024-07-31 NOTE — ED NOTES
MEDICAL SCREENING:    Reason for Visit: fall, rib pain, sternum pain    Patient initially seen in triage.  The patient was advised further evaluation and diagnostic testing will be needed, some of the treatment and testing will be initiated in the lobby in order to begin the process.  The patient will be returned to the waiting area for the time being and possibly be re-assessed by a subsequent ED provider.  The patient will be brought back to the treatment area in as timely manner as possible.     Josee Kim, APRN  07/30/24 2038

## 2024-07-31 NOTE — ED PROVIDER NOTES
Subjective   History of Present Illness  43-year-old male presents today with a primary complaint of left-sided rib pain after a fall at home 2 days prior to this presentation.  Patient noted pain is worsened with deep respirations or palpation of the left anterior chest and axillary rib cage.  Patient also noted pain with laying on his left side.  Patient noted pain with sitting upright as well.  No obvious alleviating factors other than rest.  Vital stable.  Afebrile      Review of Systems   Musculoskeletal:         Left rib pain   All other systems reviewed and are negative.      Past Medical History:   Diagnosis Date    Anxiety     Family history of stress     History of echocardiogram     Hyperlipemia     Hyperlipidemia     Neuropathy        Allergies   Allergen Reactions    Hurricaine-Dry Handle [Benzocaine] Other (See Comments)     Stops breathing and turns gray    Ativan [Lorazepam]     Chantix [Varenicline] Hallucinations    Penicillins        Past Surgical History:   Procedure Laterality Date    APPENDECTOMY      BONE SPUR ARM      CARDIAC CATHETERIZATION Left 10/13/2022    Procedure: Left Heart Cath;  Surgeon: Walter Aj IV, MD;  Location: [facility] CATH INVASIVE LOCATION;  Service: Cardiovascular;  Laterality: Left;    CHOLECYSTECTOMY         Family History   Problem Relation Age of Onset    Heart disease Father     Heart attack Father        Social History     Socioeconomic History    Marital status:    Tobacco Use    Smoking status: Every Day     Current packs/day: 0.50     Types: Cigarettes    Smokeless tobacco: Never   Substance and Sexual Activity    Alcohol use: No    Drug use: No    Sexual activity: Defer           Objective   Physical Exam  Constitutional:       General: He is not in acute distress.     Appearance: He is well-developed. He is not ill-appearing.   HENT:      Head: Normocephalic and atraumatic.   Eyes:      Extraocular Movements: Extraocular movements intact.       Pupils: Pupils are equal, round, and reactive to light.   Neck:      Vascular: No JVD.   Cardiovascular:      Rate and Rhythm: Normal rate and regular rhythm.      Heart sounds: Normal heart sounds. No murmur heard.  Pulmonary:      Effort: No tachypnea, accessory muscle usage or respiratory distress.      Breath sounds: Normal breath sounds. No stridor. No decreased breath sounds, wheezing, rhonchi or rales.   Chest:      Chest wall: Tenderness present. No deformity or crepitus.          Comments: Tenderness to palpation of the left anterior chest and left axillary rib cage that reproduce the patient's symptoms.  Abdominal:      General: Bowel sounds are normal.      Palpations: Abdomen is soft.      Tenderness: There is no abdominal tenderness. There is no guarding or rebound.   Musculoskeletal:         General: Normal range of motion.      Cervical back: Normal range of motion and neck supple.      Right lower leg: No tenderness. No edema.      Left lower leg: No tenderness. No edema.   Lymphadenopathy:      Cervical: No cervical adenopathy.   Skin:     General: Skin is warm and dry.      Coloration: Skin is not cyanotic.      Findings: No ecchymosis or erythema.   Neurological:      General: No focal deficit present.      Mental Status: He is alert and oriented to person, place, and time.      Cranial Nerves: No cranial nerve deficit.      Motor: No weakness.   Psychiatric:         Mood and Affect: Mood normal. Mood is not anxious.         Behavior: Behavior normal. Behavior is not agitated.         Procedures           ED Course  ED Course as of 07/30/24 2312 Tue Jul 30, 2024   2245 EKG notes sinus bradycardia.  58 bpm.  No acute ST elevation.  QTc 435.  Electronically signed by Julio Bush DO, 07/30/24, 10:45 PM EDT.   [SF]      ED Course User Index  [SF] Julio Bush DO                                 XR Ribs Bilateral 4+ View With PA Chest    Result Date: 7/30/2024  Impression: 1. Air space  disease lingular segment.   This report was finalized on 7/30/2024 9:46 PM by Luke Smith DO.                 Medical Decision Making  Plain film imaging of the bilateral rib cage and chest revealed no acute abnormality.  Likely rib strain.  Work up and results were discussed throughly with the patient.  The patient will be discharged for further monitoring and management with their PCP.  Red flags, warning signs, worsening symptoms, and when to return to the ER discussed with and understood by the patient.  Patient will follow up with their PCP in a timely manner.  Vitals stable at discharge.    Amount and/or Complexity of Data Reviewed  Radiology: ordered. Decision-making details documented in ED Course.  ECG/medicine tests: ordered.    Risk  Prescription drug management.        Final diagnoses:   Rib pain on left side       ED Disposition  ED Disposition       ED Disposition   Discharge    Condition   Stable    Comment   --               No follow-up provider specified.       Medication List      No changes were made to your prescriptions during this visit.            Julio Bush DO  07/30/24 8721

## 2024-09-12 ENCOUNTER — SPECIALTY PHARMACY (OUTPATIENT)
Dept: PHARMACY | Facility: HOSPITAL | Age: 44
End: 2024-09-12
Payer: MEDICARE

## 2024-09-12 RX ORDER — ALIROCUMAB 75 MG/ML
75 INJECTION, SOLUTION SUBCUTANEOUS
Qty: 2 ML | Refills: 5 | Status: SHIPPED | OUTPATIENT
Start: 2024-09-12

## 2024-09-12 NOTE — PROGRESS NOTES
Medication Management Clinic/ Specialty Pharmacy Patient Management Program  Lipid Management Program - PCSK9i Reassessment     Shoaib Eng is a 44 y.o. male referred by their provider, Dr. Aj, to the Hyperlipidemia Patient Management program offered by [facility] for Lipid Management.  A follow-up outreach was conducted, including assessment of therapy appropriateness and specialty medication education for Repatha. The patient was introduced to services offered by [facility], including: regular assessments, refill coordination, curbside pick-up or mail order delivery options, prior authorization maintenance, and financial assistance programs as applicable. The patient was also provided with contact information for the pharmacy team.     Shoaib Eng is  treated for clinical ASCVD and hyperlipidemia and currently takes atorvastatin 40 mg daily and LDL is not at goal.  In the past, Pt has tried atorvastatin. The patient denies any allergies to latex.      Patient states he has been giving his injections in the thigh and this causes him to be sore for a few days after the injection. He stated he could not remember where else he is able to administer the medication. Patient was re-counseled on injection sites, and advised he may inject in the abdomen area or back of the arm (with assistance) if either of those places are more comfortable for him.     Insurance Coverage & Financial Support  RX MEDD[employer]IME  [place] MEDICAID  Praluent Alf     Relevant Past Medical History and Comorbidities  Relevant medical history and concomitant health conditions were discussed with the patient. The patient's chart has been reviewed for relevant past medical history and comorbid conditions and updated as necessary.  Past Medical History:   Diagnosis Date    Anxiety     Family history of stress     History of echocardiogram     Hyperlipemia      Hyperlipidemia     Neuropathy      Social History     Socioeconomic History    Marital status:    Tobacco Use    Smoking status: Every Day     Current packs/day: 0.50     Types: Cigarettes    Smokeless tobacco: Never   Substance and Sexual Activity    Alcohol use: No    Drug use: No    Sexual activity: Defer       Problem list reviewed by Anna Peralta, Magdalena on 9/12/2024 at  2:29 PM    Allergies  Known allergies and reactions were discussed with the patient. The patient's chart has been reviewed for  allergy information and updated as necessary.   Allergies   Allergen Reactions    Hurricaine-Dry Handle [Benzocaine] Other (See Comments)     Stops breathing and turns gray    Ativan [Lorazepam]     Chantix [Varenicline] Hallucinations    Penicillins        Allergies reviewed by Anna Peralta PharmD on 9/12/2024 at  2:27 PM    Relevant Laboratory Values  Relevant laboratory values were discussed with the patient. The following specialty medication dose adjustment(s) are recommended: See plan, if applicable   Lab Results   Component Value Date    CHOL 153 01/25/2023    TRIG 198 (H) 01/25/2023    HDL 25 (L) 01/25/2023     (H) 04/16/2024     This lab value was taken before initiation of Praluent therapy. Patient advised to have new lipid panel done. He will be completing this at [facility] next week, and clinic will call for results to be faxed.    Current Medication List  This medication list has been reviewed with the patient and evaluated for any interactions or necessary modifications/recommendations, and updated to include all prescription medications, OTC medications, and supplements the patient is currently taking.  This list reflects what is contained in the patient's profile, which has also been marked as reviewed to communicate to other providers it is the most up to date version of the patient's current medication therapy.     Current Outpatient Medications:     Alirocumab (Praluent)  75 MG/ML solution auto-injector, Inject 1 mL under the skin into the appropriate area as directed Every 14 (Fourteen) Days., Disp: 2 mL, Rfl: 5    ALPRAZolam (XANAX) 0.25 MG tablet, Take 1 tablet by mouth Daily., Disp: , Rfl:     aspirin 81 MG EC tablet, Take 1 tablet by mouth Daily., Disp: 90 tablet, Rfl: 3    gabapentin (NEURONTIN) 600 MG tablet, Take  by mouth 3 (Three) Times a Day., Disp: , Rfl:     HYDROcodone-acetaminophen (NORCO) 7.5-325 MG per tablet, Take 1 tablet by mouth Every 6 (Six) Hours As Needed for Moderate Pain., Disp: , Rfl:     isosorbide mononitrate (IMDUR) 30 MG 24 hr tablet, Take 1 tablet by mouth Every Morning., Disp: 90 tablet, Rfl: 3    losartan (COZAAR) 25 MG tablet, Take 1 tablet by mouth Daily., Disp: 90 tablet, Rfl: 3    nicotine (Nicoderm CQ) 14 MG/24HR patch, Place 1 patch on the skin as directed by provider Daily., Disp: 30 patch, Rfl: 3    nitroglycerin (NITROSTAT) 0.4 MG SL tablet, Place 1 tablet under the tongue Every 5 (Five) Minutes As Needed for Chest Pain. Take no more than 3 doses in 15 minutes., Disp: 25 tablet, Rfl: 5    omeprazole (priLOSEC) 40 MG capsule, Take 1 capsule by mouth Daily., Disp: , Rfl:     rosuvastatin (CRESTOR) 40 MG tablet, Take 1 tablet by mouth Daily., Disp: 90 tablet, Rfl: 3    Medicines reviewed by Anna Peralta, PharmD on 9/12/2024 at  2:28 PM  Medicines reviewed by Anna Peralta, PharmD on 9/12/2024 at  2:29 PM    Drug Interactions  No significant drug interactions with repatha per literature    Goals of Therapy  Goals related to the patient's specialty therapy were discussed with the patient. The Patient Goals segment of this outreach has been reviewed and updated.   Goals Addressed Today        Specialty Pharmacy General Goal      LDL reduction            Adverse Drug Reactions  Medication tolerability: Tolerating with no to minimal ADRs  Medication plan: Continue therapy with normal follow-up  Plan for ADR Management: Addressed in Plan, if  applicable    Adherence, Self-Administration, and Current Therapy Problems  Adherence related to the patient's specialty therapy was discussed with the patient. The Adherence segment of this outreach has been reviewed and updated.     Adherence Questions  Linked Medication(s) Assessed: Alirocumab  On average, how many doses/injections does the patient miss per month?: 0  What are the identified reasons for non-adherence or missed doses? : no problems identified  What is the estimated medication adherence level?: %  Based on the patient/caregiver response and refill history, does this patient require an MTP to track adherence improvements?: no    Additional Barriers to Patient Self-Administration: Addressed in Plan, if applicable  Methods for Supporting Patient Self-Administration: Addressed in Plan, if applicable    Open Medication Therapy Problems  No medication therapy recommendations to display    Quality of Life Assessment   Quality of Life related to the patient's enrollment in the patient management program and services provided was discussed with the patient. The QOL segment of this outreach has been reviewed and updated.  Quality of Life Improvement Scale: 8-Moderately better    Medication Assessment & Plan  Medication Therapy Changes: Patient continues on Praluent 75 mg SC every 2 weeks .   Welcome information and patient satisfaction survey to be sent by specialty pharmacy team with patient's initial fill.  Related Plans, Therapy Recommendations, or Therapy Problems to Be Addressed:   Patient was counseled again on where he may inject the medication (thigh, abdomen or back of the arm)  Patient advised to have new lipid panel done. He will be completing this at [facility] next week, and clinic will call for results to be faxed.   Patient will continue regular follow-up with cardiology. Next appointment is 4/22/25  Patient will follow up with specialty pharmacy for next injection. Care Coordinator to  set up future refill outreaches, coordinate prescription delivery, and escalate clinical questions to pharmacist.  Pharmacist to perform regular assessments no more than (6) months from the previous assessment. Will follow-up in 6 months, or sooner if needed.    Attestation  Therapeutic appropriateness: Appropriate   I attest the patient was actively involved in and has agreed to the above plan of care. If the prescribed therapy is at any point deemed not appropriate based on the current or future assessments, a consultation will be initiated with the patient's specialty care provider to determine the best course of action. The revised plan of therapy will be documented along with any required assessments and/or additional patient education provided.     Anna Peralta, PharmD  9/12/2024  14:32 EDT

## 2024-10-07 ENCOUNTER — SPECIALTY PHARMACY (OUTPATIENT)
Dept: PHARMACY | Facility: HOSPITAL | Age: 44
End: 2024-10-07
Payer: MEDICARE

## 2024-10-07 NOTE — PROGRESS NOTES
Specialty Pharmacy Refill Coordination Note     Shoaib is a 44 y.o. male contacted today regarding refills of  Praluent 75 mg specialty medication(s).    Reviewed and verified with patient:       Specialty medication(s) and dose(s) confirmed: yes    Refill Questions      Flowsheet Row Most Recent Value   Changes to allergies? No   Changes to medications? No   New conditions or infections since last clinic visit No   Unplanned office visit, urgent care, ED, or hospital admission in the last 4 weeks  No   How does patient/caregiver feel medication is working? Very good   Financial problems or insurance changes  No   Since the previous refill, were any specialty medication doses or scheduled injections missed or delayed?  No   Does this patient require a clinical escalation to a pharmacist? No            Delivery Questions      Flowsheet Row Most Recent Value   Delivery method [employer]   Delivery address verified with patient/caregiver? Yes   Delivery address Home   Number of medications in delivery 1   Medication(s) being filled and delivered Alirocumab   Copay verified? Yes   Copay amount 0   Copay form of payment No copayment ($0)   Ship Date ship 10/08 deliver 10/09   Delivery Date ship 10/08 deliver 10/09   Signature Required No                   Follow-up: 84 day(s)     Brooke Hui, Pharmacy Technician  Specialty Pharmacy Technician

## 2024-12-04 ENCOUNTER — SPECIALTY PHARMACY (OUTPATIENT)
Dept: PHARMACY | Facility: HOSPITAL | Age: 44
End: 2024-12-04
Payer: MEDICARE

## 2024-12-04 NOTE — PROGRESS NOTES
Specialty Pharmacy Patient Management Program  Medication Management Clinic Refill Outreach      Shoaib was contacted today regarding refills of his medication(s).    Specialty medication(s) and dose(s) confirmed: Praluent 75 mg    Refill Questions      Flowsheet Row Most Recent Value   Changes to allergies? No   Changes to medications? No   New conditions or infections since last clinic visit No   Unplanned office visit, urgent care, ED, or hospital admission in the last 4 weeks  No   How does patient/caregiver feel medication is working? Good   Financial problems or insurance changes  No   Since the previous refill, were any specialty medication doses or scheduled injections missed or delayed?  No   Does this patient require a clinical escalation to a pharmacist? No          Delivery Questions      Flowsheet Row Most Recent Value   Delivery method  at Pharmacy   Copay verified? Yes   Copay amount $0.00   Copay form of payment No copayment ($0)            Follow-Up: 56d     Anna Peralta, PharmD  12/4/2024  14:04 EST

## 2025-02-03 ENCOUNTER — SPECIALTY PHARMACY (OUTPATIENT)
Dept: PHARMACY | Facility: HOSPITAL | Age: 45
End: 2025-02-03
Payer: MEDICARE

## 2025-02-03 RX ORDER — ALIROCUMAB 75 MG/ML
75 INJECTION, SOLUTION SUBCUTANEOUS
Qty: 2 ML | Refills: 5 | Status: SHIPPED | OUTPATIENT
Start: 2025-02-03

## 2025-02-03 NOTE — PROGRESS NOTES
Specialty Pharmacy Patient Management Program  Medication Management Clinic Refill Outreach      Shoaib was contacted today regarding refills of his medication(s).    Specialty medication(s) and dose(s) confirmed: Praluent 75 mg    Refill Questions      Flowsheet Row Most Recent Value   Changes to allergies? No   Changes to medications? No   New conditions or infections since last clinic visit No   Unplanned office visit, urgent care, ED, or hospital admission in the last 4 weeks  No   How does patient/caregiver feel medication is working? Very good   Financial problems or insurance changes  No   Since the previous refill, were any specialty medication doses or scheduled injections missed or delayed?  No   Does this patient require a clinical escalation to a pharmacist? No          Delivery Questions      Flowsheet Row Most Recent Value   Delivery method  at Pharmacy   Copay verified? Yes   Copay amount $0.00   Copay form of payment No copayment ($0)   Ship Date n/a   Signature Required No            Follow-Up: 84 days    Anna Peralta, PharmD  2/3/2025  14:03 EST

## 2025-02-03 NOTE — PROGRESS NOTES
Medication Management Clinic/ Specialty Pharmacy Patient Management Program  Lipid Management Program - PCSK9i Initial Assessment     Shoaib Eng is a 44 y.o. male referred by their provider, Dr. Aj, to the Hyperlipidemia Patient Management program offered by [facility] for Lipid Management.  Shoaib Eng is  treated for clinical ASCVD and hyperlipidemia and currently takes rosuvastatin 40 mg daily and repatha sureclick. In the past, Pt has tried atorvastatin and rosuvastatin. The patient denies any allergies to latex.      A follow-up outreach was conducted, including assessment of continued therapy appropriateness, medication adherence, and side effect incidence and management for Praluent. The patient denies any trouble giving themself the injection.  They deny missing doses or adverse effects.     Changes to Insurance Coverage or Financial Support  [employer] and lipid navi    Relevant Past Medical History and Comorbidities  Relevant medical history and concomitant health conditions were discussed with the patient. The patient's chart has been reviewed for relevant past medical history and comorbid health conditions and updated as necessary.   Past Medical History:   Diagnosis Date    Anxiety     Family history of stress     History of echocardiogram     Hyperlipemia     Hyperlipidemia     Neuropathy      Social History     Socioeconomic History    Marital status:    Tobacco Use    Smoking status: Every Day     Current packs/day: 0.50     Types: Cigarettes    Smokeless tobacco: Never   Substance and Sexual Activity    Alcohol use: No    Drug use: No    Sexual activity: Defer     Problem list reviewed by Anna Peralta, PharmD on 2/3/2025 at  1:57 PM    Hospitalizations and Urgent Care Since Last Assessment  ED Visits, Admissions, or Hospitalizations: none  Urgent Office Visits: none    Allergies  Known allergies and reactions were  discussed with the patient. The patient's chart has been reviewed for allergy information and updated as necessary.   Allergies   Allergen Reactions    Hurricaine-Dry Handle [Benzocaine] Other (See Comments)     Stops breathing and turns gray    Ativan [Lorazepam]     Chantix [Varenicline] Hallucinations    Penicillins      Allergies reviewed by Anna Peralta, PharmD on 2/3/2025 at  1:55 PM    Relevant Laboratory Values  Relevant laboratory values were discussed with the patient. The following specialty medication dose adjustment(s) are recommended: none    Lab Results   Component Value Date    GLUCOSE 91 04/16/2024    CALCIUM 9.5 04/16/2024     (H) 04/16/2024    K 3.9 04/16/2024    CO2 26.0 04/16/2024     04/16/2024    BUN 7 04/16/2024    CREATININE 0.94 04/16/2024    EGFRIFNONA 110 08/29/2017    BCR 7.4 04/16/2024    ANIONGAP 17.0 (H) 04/16/2024     Lab Results   Component Value Date    CHOL 153 01/25/2023    TRIG 198 (H) 01/25/2023    HDL 25 (L) 01/25/2023     (H) 04/16/2024     [facility] faxing most current labs from 12/30/24. LDL 40 mg/dl    Current Medication List  This medication list has been reviewed with the patient and evaluated for any interactions or necessary modifications/recommendations, and updated to include all prescription medications, OTC medications, and supplements the patient is currently taking.  This list reflects what is contained in the patient's profile, which has also been marked as reviewed to communicate to other providers it is the most up to date version of the patient's current medication therapy.     Current Outpatient Medications:     Alirocumab (Praluent) 75 MG/ML solution auto-injector, Inject 1 mL under the skin into the appropriate area as directed Every 14 (Fourteen) Days., Disp: 2 mL, Rfl: 5    ALPRAZolam (XANAX) 0.25 MG tablet, Take 1 tablet by mouth Daily., Disp: , Rfl:     aspirin 81 MG EC tablet, Take 1 tablet by mouth Daily., Disp: 90 tablet,  Rfl: 3    gabapentin (NEURONTIN) 600 MG tablet, Take  by mouth 3 (Three) Times a Day., Disp: , Rfl:     HYDROcodone-acetaminophen (NORCO) 7.5-325 MG per tablet, Take 1 tablet by mouth Every 6 (Six) Hours As Needed for Moderate Pain., Disp: , Rfl:     isosorbide mononitrate (IMDUR) 30 MG 24 hr tablet, Take 1 tablet by mouth Every Morning., Disp: 90 tablet, Rfl: 3    losartan (COZAAR) 25 MG tablet, Take 1 tablet by mouth Daily., Disp: 90 tablet, Rfl: 3    nicotine (Nicoderm CQ) 14 MG/24HR patch, Place 1 patch on the skin as directed by provider Daily., Disp: 30 patch, Rfl: 3    nitroglycerin (NITROSTAT) 0.4 MG SL tablet, Place 1 tablet under the tongue Every 5 (Five) Minutes As Needed for Chest Pain. Take no more than 3 doses in 15 minutes., Disp: 25 tablet, Rfl: 5    omeprazole (priLOSEC) 40 MG capsule, Take 1 capsule by mouth Daily., Disp: , Rfl:     rosuvastatin (CRESTOR) 40 MG tablet, Take 1 tablet by mouth Daily., Disp: 90 tablet, Rfl: 3    Medicines reviewed by Anna Peralta, PharmD on 2/3/2025 at  1:57 PM    Drug Interactions  None with praluent    Adverse Drug Reactions  Medication tolerability: Tolerating with no to minimal ADRs  Medication plan: Continue therapy with normal follow-up  Plan for ADR Management: none    Adherence, Self-Administration, and Current Therapy Problems  Adherence related to the patient's specialty therapy was discussed with the patient. The Adherence segment of this outreach has been reviewed and updated.     Adherence Questions  Linked Medication(s) Assessed: Alirocumab (Praluent)  On average, how many doses/injections does the patient miss per month?: 0  What are the identified reasons for non-adherence or missed doses? : no problems identified  What is the estimated medication adherence level?: %  Based on the patient/caregiver response and refill history, does this patient require an MTP to track adherence improvements?: no    Additional Barriers to Patient  Self-Administration: none  Methods for Supporting Patient Self-Administration: changed injection site to his stomach and has tolerated this much better than in his thigh. Also uses calendar to sharmaine doses    Open Medication Therapy Problems  No medication therapy recommendations to display    Goals of Therapy  Goals related to the patient's specialty therapy were discussed with the patient. The Patient Goals segment of this outreach has been reviewed and updated.   Goals Addressed Today        Specialty Pharmacy General Goal      LDL < 70 mg/dl    2/3/25 [place]: LDL-direct from 4/2024 was 110 mg/dl. [facility] will be faxing patient's current lipid panel. LDL decreased from 110 to 40 mg/dl. At goal              Quality of Life Assessment   Quality of Life related to the patient's enrollment in the patient management program and services provided was discussed with the patient. The QOL segment of this outreach has been reviewed and updated.  Quality of Life Improvement Scale: 8-Moderately better    Reassessment Plan & Follow-Up  1. Medication Therapy Changes: none  2. Related Plans, Therapy Recommendations, or Issues to Be Addressed:    -[facility] faxing labs from 12/30/24   -LDL at goal  3. Pharmacist to perform regular assessments no more than (6) months from the previous assessment.  Patient will continue regular follow-up with referring provider.   4. Care Coordinator to set up future refill outreaches, coordinate prescription delivery, and escalate clinical questions to pharmacist.    Attestation  Therapeutic appropriateness: Appropriate   I attest the patient was actively involved in and has agreed to the above plan of care.  If the prescribed therapy is at any point deemed not appropriate based on the current or future assessments, a consultation will be initiated with the patient's specialty care provider to determine the best course of action. The revised plan of therapy will be documented along with any  required assessments and/or additional patient education provided.     Anna Peralta, PharmD  2/3/2025  13:59 EST

## 2025-02-20 ENCOUNTER — SPECIALTY PHARMACY (OUTPATIENT)
Dept: PHARMACY | Facility: HOSPITAL | Age: 45
End: 2025-02-20
Payer: MEDICARE

## 2025-02-20 NOTE — PROGRESS NOTES
Specialty Pharmacy Patient Management Program  Refill Outreach     Shoaib was contacted today regarding refills of their medication(s).    Refill Questions      Flowsheet Row Most Recent Value   Changes to allergies? No   Changes to medications? No   New conditions or infections since last clinic visit No   Unplanned office visit, urgent care, ED, or hospital admission in the last 4 weeks  No   How does patient/caregiver feel medication is working? Good   Financial problems or insurance changes  No   Since the previous refill, were any specialty medication doses or scheduled injections missed or delayed?  No   Does this patient require a clinical escalation to a pharmacist? No            Delivery Questions      Flowsheet Row Most Recent Value   Delivery method  at Pharmacy   Delivery address Prescription   Medication(s) being filled and delivered Alirocumab (Praluent)   Copay verified? Yes   Copay amount $0   Copay form of payment No copayment ($0)   Signature Required No                 Follow-up: 84 day(s)     Brooke Hui, Pharmacy Technician  2/20/2025  15:45 EST     Final Anesthesia Post-op Assessment    Patient: Aida Phelps  Procedure(s) Performed: PUBOVAGINAL SLING PLACEMENT, CYSTOSCOPY  Anesthesia type: General    Vitals Value Taken Time   Temp 35.4 Â°C (95.7 Â°F) 05/24/22 1220   Pulse 51 05/24/22 1220   Resp 10 05/24/22 1220   SpO2 98 % 05/24/22 1220   /62 05/24/22 1220         Patient Location: PACU Phase 1  Post-op Vital Signs:stable  Level of Consciousness: awake and alert  Respiratory Status: spontaneous ventilation  Cardiovascular stable  Hydration: euvolemic  Pain Management: adequately controlled  Handoff: Handoff to receiving nurse was performed and questions were answered  Vomiting: none  Nausea: None  Airway Patency:patent  Post-op Assessment: awake, alert, appropriately conversant, or baseline, no complications, patient tolerated procedure well with no complications, no evidence of recall, dentition within defined limits, moving all extremities and No Corneal Abrasion      No complications documented.

## 2025-04-16 NOTE — PROGRESS NOTES
Cardiology Outpatient Visit      Identification: Shoaib Eng is a 44 y.o. male who resides in [place], [place]    Reason for visit:  Coronary artery disease involving native coronary artery of (Pt c/o recent chest tightness, he reports that he has had an increase in stress due to his daughter being ill. )      Subjective      Patient is a 44-year-old gentleman who returns today for follow-up of his coronary artery disease and cardiac risk factors.  At his last visit the patient had reported some nonexertional chest tightness and attributed to stress from his 14-year-old daughter who has medical problems.  The patient reports still having chest discomfort that has basically unchanged since his last visit.  He continues to smoke cigarettes but has decreased the amount quit from 3 packs a day to less than a pack a day.  He denies any shortness of breath.  He reports his chest discomfort does not happen when he is exerting himself and mowing his lawn but can happen when he is sitting at rest.  His last cardiac cath in 2022 showed a chronic occlusion of the circumflex for which medical management was recommended.  He had 40% LAD and 30% RCA.  He has not used any sublingual nitroglycerin.  His last lipid panel was at goal.    Review of Systems   Constitutional: Negative for malaise/fatigue.   Eyes:  Negative for vision loss in left eye and vision loss in right eye.   Cardiovascular:  Positive for chest pain. Negative for dyspnea on exertion, near-syncope, orthopnea, palpitations, paroxysmal nocturnal dyspnea and syncope.   Musculoskeletal:  Negative for myalgias.   Neurological:  Negative for brief paralysis, excessive daytime sleepiness, focal weakness, numbness, paresthesias and weakness.   All other systems reviewed and are negative.      Allergies   Allergen Reactions    Hurricaine-Dry Handle [Benzocaine] Other (See Comments)     Stops breathing and turns gray    Ativan [Lorazepam]     Chantix  "[Varenicline] Hallucinations    Penicillins          Current Outpatient Medications   Medication Instructions    ALPRAZolam (XANAX) 0.25 MG tablet 1 tablet, Daily    aspirin 81 mg, Oral, Daily    gabapentin (NEURONTIN) 600 MG tablet 3 Times Daily    HYDROcodone-acetaminophen (NORCO) 7.5-325 MG per tablet 1 tablet, Every 6 Hours PRN    isosorbide mononitrate (IMDUR) 60 mg, Oral, Every Morning    losartan (COZAAR) 25 mg, Oral, Daily    nicotine (Nicoderm CQ) 14 MG/24HR patch 1 patch, Transdermal, Every 24 Hours    nitroglycerin (NITROSTAT) 0.4 mg, Sublingual, Every 5 Minutes PRN, Take no more than 3 doses in 15 minutes.    omeprazole (PRILOSEC) 40 mg, Daily    Praluent 75 mg, Subcutaneous, Every 14 Days    rosuvastatin (CRESTOR) 40 mg, Oral, Daily         Objective     /78 (BP Location: Right arm, Patient Position: Sitting, Cuff Size: Adult)   Pulse 63   Ht 180.3 cm (71\")   Wt 107 kg (236 lb 6.4 oz)   SpO2 98%   BMI 32.97 kg/m²       Constitutional:       Appearance: Healthy appearance. Well-developed.   Eyes:      General: Lids are normal. No scleral icterus.     Conjunctiva/sclera: Conjunctivae normal.   HENT:      Head: Normocephalic and atraumatic.   Neck:      Thyroid: No thyromegaly.      Vascular: No carotid bruit or JVD.   Pulmonary:      Effort: Pulmonary effort is normal.      Breath sounds: Normal breath sounds. No wheezing. No rhonchi. No rales.   Cardiovascular:      Normal rate. Regular rhythm.      Murmurs: There is no murmur.      No gallop.  No rub.   Pulses:     Intact distal pulses.   Edema:     Peripheral edema absent.   Abdominal:      General: There is no distension.      Palpations: Abdomen is soft. There is no abdominal mass.   Musculoskeletal:      Cervical back: Normal range of motion. Skin:     General: Skin is warm and dry.      Findings: No rash.   Neurological:      General: No focal deficit present.      Mental Status: Alert and oriented to person, place, and time.      Gait: " Gait is intact.   Psychiatric:         Attention and Perception: Attention normal.         Mood and Affect: Mood normal.         Behavior: Behavior normal.         Result Review  (reviewed with patient):        ECG 12 Lead    Date/Time: 4/22/2025 11:15 AM  Performed by: Erica Keller APRN    Authorized by: Erica Keller APRN  Comparison: compared with previous ECG from 7/30/2024  Similar to previous ECG  Comparison to previous ECG: No change from prior  Rhythm: sinus rhythm  BPM: 63    Clinical impression: normal ECG  Comments: QT/QTc 424/433 mm           Lab Results   Component Value Date    GLUCOSE 91 04/16/2024    BUN 7 04/16/2024    CREATININE 0.94 04/16/2024     (H) 04/16/2024    K 3.9 04/16/2024     04/16/2024    CALCIUM 9.5 04/16/2024    PROTEINTOT 7.6 04/16/2024    ALBUMIN 4.4 04/16/2024    ALT 25 04/16/2024    AST 16 04/16/2024    ALKPHOS 88 04/16/2024    BILITOT 0.2 04/16/2024    GLOB 3.2 04/16/2024    AGRATIO 1.4 04/16/2024    BCR 7.4 04/16/2024    ANIONGAP 17.0 (H) 04/16/2024    EGFR 103.2 04/16/2024     Lab Results   Component Value Date    WBC 10.1 12/30/2024    HGB 15.7 12/30/2024    HCT 46.2 12/30/2024    MCV 93 12/30/2024     12/30/2024     Lab Results   Component Value Date    CHOL 153 01/25/2023    TRIG 198 (H) 01/25/2023    HDL 25 (L) 01/25/2023     (H) 04/16/2024     Lab Results   Component Value Date    HGBA1C 5.7 (H) 12/30/2024             Assessment     Diagnoses and all orders for this visit:    1. Coronary artery disease involving native coronary artery of native heart with angina pectoris (Primary)  Overview:  Echocardiogram (5/3/2022): LVEF 65%. Moderate concentric hypertrophy.  Nuclear stress test (5/4/2022): No evidence of ischemia. LVEF 64%.   CCS class II angina  CT coronary angiogram (10/7/2022): Possible  of left circumflex.  Moderate proximal LAD disease.  Cardiac catheterization (10/13/2022): Severe 1-vessel CAD involving chronic total  occlusion of the left circumflex.  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  Increase isosorbide 60 mg daily  Continue aspirin 81 mg daily  If symptoms do not improve, progress, worsen and become associate with exertional activity she contact us would recommend stress test.  He would like to defer for now    Orders:  -     nitroglycerin (NITROSTAT) 0.4 MG SL tablet; Place 1 tablet under the tongue Every 5 (Five) Minutes As Needed for Chest Pain. Take no more than 3 doses in 15 minutes.  Dispense: 25 tablet; Refill: 5  -     isosorbide mononitrate (IMDUR) 60 MG 24 hr tablet; Take 1 tablet by mouth Every Morning.  Dispense: 60 tablet; Refill: 1    2. Hyperlipidemia LDL goal <70  Overview:  High intensity statin therapy indicated given the presence of CAD    Assessment & Plan:  Continue Crestor 40 mg daily  Praluent 75 mg subcutaneous injection every 2 weeks      3. Tobacco use  Assessment & Plan:  Recommend smoking cessation      Other orders  -     LABS SCANNED  -     ECG 12 Lead          Plan   Increase isosorbide 60 mg daily.  We discussed stress test but he would like to defer.  He attributes his symptoms to stress.  He understands if his symptoms progress, worsen or becoming associate with exertional activity he should contact us  Continue other medications as prescribed      Follow-up   Return in about 6 months (around 10/22/2025), or if symptoms worsen or fail to improve, for Follow-up with Dr. Aj next visit.        Erica Keller, EJ  4/22/2025

## 2025-04-22 ENCOUNTER — OFFICE VISIT (OUTPATIENT)
Dept: CARDIOLOGY | Facility: CLINIC | Age: 45
End: 2025-04-22
Payer: MEDICARE

## 2025-04-22 VITALS
SYSTOLIC BLOOD PRESSURE: 122 MMHG | WEIGHT: 236.4 LBS | HEIGHT: 71 IN | DIASTOLIC BLOOD PRESSURE: 78 MMHG | BODY MASS INDEX: 33.1 KG/M2 | OXYGEN SATURATION: 98 % | HEART RATE: 63 BPM

## 2025-04-22 DIAGNOSIS — Z72.0 TOBACCO USE: ICD-10-CM

## 2025-04-22 DIAGNOSIS — E78.5 HYPERLIPIDEMIA LDL GOAL <70: ICD-10-CM

## 2025-04-22 DIAGNOSIS — I25.119 CORONARY ARTERY DISEASE INVOLVING NATIVE CORONARY ARTERY OF NATIVE HEART WITH ANGINA PECTORIS: Primary | ICD-10-CM

## 2025-04-22 PROCEDURE — 1160F RVW MEDS BY RX/DR IN RCRD: CPT | Performed by: NURSE PRACTITIONER

## 2025-04-22 PROCEDURE — 93000 ELECTROCARDIOGRAM COMPLETE: CPT | Performed by: NURSE PRACTITIONER

## 2025-04-22 PROCEDURE — 99214 OFFICE O/P EST MOD 30 MIN: CPT | Performed by: NURSE PRACTITIONER

## 2025-04-22 PROCEDURE — 1159F MED LIST DOCD IN RCRD: CPT | Performed by: NURSE PRACTITIONER

## 2025-04-22 RX ORDER — ISOSORBIDE MONONITRATE 60 MG/1
60 TABLET, EXTENDED RELEASE ORAL EVERY MORNING
Qty: 60 TABLET | Refills: 1 | Status: SHIPPED | OUTPATIENT
Start: 2025-04-22

## 2025-04-22 RX ORDER — NITROGLYCERIN 0.4 MG/1
0.4 TABLET SUBLINGUAL
Qty: 25 TABLET | Refills: 5 | Status: SHIPPED | OUTPATIENT
Start: 2025-04-22

## 2025-04-22 NOTE — ASSESSMENT & PLAN NOTE
Patient reports nonexertional chest discomfort unchanged since his last visit.  Increase isosorbide 60 mg daily  Continue aspirin 81 mg daily  If symptoms do not improve, progress, worsen and become associate with exertional activity she contact us would recommend stress test.  He would like to defer for now

## 2025-04-23 ENCOUNTER — SPECIALTY PHARMACY (OUTPATIENT)
Dept: PHARMACY | Facility: HOSPITAL | Age: 45
End: 2025-04-23
Payer: MEDICARE

## 2025-04-23 NOTE — PROGRESS NOTES
Specialty Pharmacy Patient Management Program  Medication Management Clinic Refill Outreach      Shoaib was contacted today regarding refills of his medication(s).    Specialty medication(s) and dose(s) confirmed: Praluent 75 mg    Refill Questions      Flowsheet Row Most Recent Value   Changes to allergies? No   Changes to medications? Yes  [isosorbide increased from 30 mg to 60 mg]   New conditions or infections since last clinic visit No   Unplanned office visit, urgent care, ED, or hospital admission in the last 4 weeks  No   How does patient/caregiver feel medication is working? Very good   Financial problems or insurance changes  No   Since the previous refill, were any specialty medication doses or scheduled injections missed or delayed?  No   Does this patient require a clinical escalation to a pharmacist? No          Delivery Questions      Flowsheet Row Most Recent Value   Delivery method  at Pharmacy   Delivery address verified with patient/caregiver? No  [apothecary ]   Other address preferred apothecary    Medication(s) being filled and delivered Alirocumab (Praluent)   Doses left of specialty medications 0   Copay verified? Yes   Copay amount $0.00   Copay form of payment No copayment ($0)   Delivery Date Selection --  [apothecary ]   Signature Required No   Do you consent to receive electronic handouts?  Yes            Follow-Up: 84 days    Anna Peralta, PharmD  4/23/2025  14:05 EDT

## 2025-07-29 RX ORDER — ALIROCUMAB 75 MG/ML
75 INJECTION, SOLUTION SUBCUTANEOUS
Qty: 2 ML | Refills: 5 | Status: CANCELLED | OUTPATIENT
Start: 2025-07-29

## 2025-07-31 ENCOUNTER — SPECIALTY PHARMACY (OUTPATIENT)
Dept: PHARMACY | Facility: HOSPITAL | Age: 45
End: 2025-07-31
Payer: MEDICARE

## 2025-07-31 RX ORDER — ALIROCUMAB 75 MG/ML
75 INJECTION, SOLUTION SUBCUTANEOUS
Qty: 6 ML | Refills: 1 | Status: SHIPPED | OUTPATIENT
Start: 2025-07-31

## 2025-07-31 NOTE — PROGRESS NOTES
Specialty Pharmacy Patient Management Program  Medication Management Clinic Refill Outreach      Shoaib was contacted today regarding refills of his medication(s).    Specialty medication(s) and dose(s) confirmed: praluent    Refill Questions      Flowsheet Row Most Recent Value   Changes to allergies? No   Changes to medications? No   New conditions or infections since last clinic visit No   Unplanned office visit, urgent care, ED, or hospital admission in the last 4 weeks  No   How does patient/caregiver feel medication is working? Very good   Financial problems or insurance changes  No   Since the previous refill, were any specialty medication doses or scheduled injections missed or delayed?  No   Does this patient require a clinical escalation to a pharmacist? No          Delivery Questions      Flowsheet Row Most Recent Value   Delivery method  at Pharmacy   Delivery address verified with patient/caregiver? No  [apothecary ]   Delivery address Other (Enter below)   Other address preferred apothecary    Number of medications in delivery 0  [apothecary ]   Medication(s) being filled and delivered Alirocumab (Praluent)   Doses left of specialty medications 0   Copay verified? Yes   Copay amount $0.00   Copay form of payment No copayment ($0)   Delivery Date Selection --  [apothecary ]   Signature Required No   Do you consent to receive electronic handouts?  Yes            Follow-Up: 84 days    Anna Peralta, PharmD  7/31/2025  11:45 EDT

## 2025-07-31 NOTE — PROGRESS NOTES
Medication Management Clinic/ Specialty Pharmacy Patient Management Program  Lipid Management Program - PCSK9i Follow up Assessment     Shoaib Eng is a 45 y.o. male referred by their provider, Dr. Aj, to the Hyperlipidemia Patient Management program offered by [facility] for Lipid Management.  Shoaib Eng is  treated for clinical ASCVD and hyperlipidemia and currently takes rosuvastatin 40 mg daily and Praluent. In the past, Pt has tried atorvastatin and rosuvastatin. The patient denies any allergies to latex.      A follow-up outreach was conducted, including assessment of continued therapy appropriateness, medication adherence, and side effect incidence and management for Praluent. The patient denies any trouble giving themself the injection.  They deny missing doses or adverse effects.     Changes to Insurance Coverage or Financial Support  [employer] and lipid navi    Relevant Past Medical History and Comorbidities  Relevant medical history and concomitant health conditions were discussed with the patient. The patient's chart has been reviewed for relevant past medical history and comorbid health conditions and updated as necessary.   Past Medical History:   Diagnosis Date    Anxiety     Family history of stress     History of echocardiogram     Hyperlipemia     Hyperlipidemia     Neuropathy      Social History     Socioeconomic History    Marital status:    Tobacco Use    Smoking status: Every Day     Current packs/day: 0.50     Types: Cigarettes    Smokeless tobacco: Never   Substance and Sexual Activity    Alcohol use: No    Drug use: No    Sexual activity: Defer     Problem list reviewed by Anna Peralta, PharmD on 7/31/2025 at 11:45 AM    Hospitalizations and Urgent Care Since Last Assessment  ED Visits, Admissions, or Hospitalizations: none  Urgent Office Visits: none    Allergies  Known allergies and reactions were  discussed with the patient. The patient's chart has been reviewed for allergy information and updated as necessary.   Allergies   Allergen Reactions    Hurricaine-Dry Handle [Benzocaine] Other (See Comments)     Stops breathing and turns gray    Ativan [Lorazepam]     Chantix [Varenicline] Hallucinations    Penicillins      Allergies reviewed by Anna Peralta, PharmD on 7/31/2025 at 11:44 AM    Relevant Laboratory Values  Relevant laboratory values were discussed with the patient. The following specialty medication dose adjustment(s) are recommended: none    Lab Results   Component Value Date    GLUCOSE 91 04/16/2024    CALCIUM 9.5 04/16/2024     (H) 04/16/2024    K 3.9 04/16/2024    CO2 26.0 04/16/2024     04/16/2024    BUN 7 04/16/2024    CREATININE 0.94 04/16/2024    EGFRIFNONA 110 08/29/2017    BCR 7.4 04/16/2024    ANIONGAP 17.0 (H) 04/16/2024         Current Medication List  This medication list has been reviewed with the patient and evaluated for any interactions or necessary modifications/recommendations, and updated to include all prescription medications, OTC medications, and supplements the patient is currently taking.  This list reflects what is contained in the patient's profile, which has also been marked as reviewed to communicate to other providers it is the most up to date version of the patient's current medication therapy.     Current Outpatient Medications:     Alirocumab (Praluent) 75 MG/ML solution auto-injector, Inject 1 mL under the skin into the appropriate area as directed Every 14 (Fourteen) Days., Disp: 2 mL, Rfl: 5    ALPRAZolam (XANAX) 0.25 MG tablet, Take 1 tablet by mouth Daily., Disp: , Rfl:     aspirin 81 MG EC tablet, Take 1 tablet by mouth Daily., Disp: 90 tablet, Rfl: 3    gabapentin (NEURONTIN) 600 MG tablet, Take  by mouth 3 (Three) Times a Day., Disp: , Rfl:     HYDROcodone-acetaminophen (NORCO) 7.5-325 MG per tablet, Take 1 tablet by mouth Every 6 (Six) Hours  As Needed for Moderate Pain., Disp: , Rfl:     isosorbide mononitrate (IMDUR) 60 MG 24 hr tablet, Take 1 tablet by mouth Every Morning., Disp: 60 tablet, Rfl: 1    losartan (COZAAR) 25 MG tablet, Take 1 tablet by mouth Daily., Disp: 90 tablet, Rfl: 3    nicotine (Nicoderm CQ) 14 MG/24HR patch, Place 1 patch on the skin as directed by provider Daily., Disp: 30 patch, Rfl: 3    nitroglycerin (NITROSTAT) 0.4 MG SL tablet, Place 1 tablet under the tongue Every 5 (Five) Minutes As Needed for Chest Pain. Take no more than 3 doses in 15 minutes., Disp: 25 tablet, Rfl: 5    omeprazole (priLOSEC) 40 MG capsule, Take 1 capsule by mouth Daily., Disp: , Rfl:     rosuvastatin (CRESTOR) 40 MG tablet, Take 1 tablet by mouth Daily., Disp: 90 tablet, Rfl: 3    Medicines reviewed by Anna Peralta, PharmD on 7/31/2025 at 11:45 AM    Drug Interactions  No significant drug interactions with praluent per literature    Adverse Drug Reactions  Medication tolerability: Tolerating with no to minimal ADRs  Medication plan: Continue therapy with normal follow-up  Plan for ADR Management: none    Adherence, Self-Administration, and Current Therapy Problems  Adherence related to the patient's specialty therapy was discussed with the patient. The Adherence segment of this outreach has been reviewed and updated.     Adherence Questions  Linked Medication(s) Assessed: Alirocumab (Praluent)  On average, how many doses/injections does the patient miss per month?: 0  What are the identified reasons for non-adherence or missed doses? : no problems identified  What is the estimated medication adherence level?: %  Based on the patient/caregiver response and refill history, does this patient require an MTP to track adherence improvements?: no    Additional Barriers to Patient Self-Administration: none  Methods for Supporting Patient Self-Administration: uses calendar to sharmaine doses    Open Medication Therapy Problems  No medication therapy  recommendations to display    Goals of Therapy  Goals related to the patient's specialty therapy were discussed with the patient. The Patient Goals segment of this outreach has been reviewed and updated.   Goals Addressed Today        Specialty Pharmacy General Goal      LDL < 70 mg/dl    2/3/25 [place]: LDL-direct from 4/2024 was 110 mg/dl. [employer] will be faxing patient's current lipid panel. LDL decreased from 110 to 40 mg/dl. At goal    7/31/25 [place]: Ldl at goal at 40 mg/dl              Quality of Life Assessment   Quality of Life related to the patient's enrollment in the patient management program and services provided was discussed with the patient. The QOL segment of this outreach has been reviewed and updated.  Quality of Life Improvement Scale: 10-Significantly better    Reassessment Plan & Follow-Up  1. Medication Therapy Changes: none  2. Related Plans, Therapy Recommendations, or Issues to Be Addressed:   LDL at goal  Refill coordination completed today  6 months of refills sent for patient  Next cardiology appointment is 11/18/25  3. Pharmacist to perform regular assessments no more than (6) months from the previous assessment.  Patient will continue regular follow-up with referring provider.   4. Care Coordinator to set up future refill outreaches, coordinate prescription delivery, and escalate clinical questions to pharmacist.    Attestation  Therapeutic appropriateness: Appropriate   I attest the patient was actively involved in and has agreed to the above plan of care.  If the prescribed therapy is at any point deemed not appropriate based on the current or future assessments, a consultation will be initiated with the patient's specialty care provider to determine the best course of action. The revised plan of therapy will be documented along with any required assessments and/or additional patient education provided.     Anna Peralta, PharmD  7/31/2025  11:45 EDT

## (undated) DEVICE — PK CATH CARD 10

## (undated) DEVICE — MODEL BT2000 P/N 700287-012KIT CONTENTS: MANIFOLD WITH SALINE AND CONTRAST PORTS, SALINE TUBING WITH SPIKE AND HAND SYRINGE, TRANSDUCER: Brand: BT2000 AUTOMATED MANIFOLD KIT

## (undated) DEVICE — CVR PROB ULTRASND/TRANSD W/GEL 7X11IN STRL

## (undated) DEVICE — ADULT, W/LG. BACK PAD, RADIOTRANSPARENT ELEMENT AND LEAD WIRE: Brand: DEFIBRILLATION ELECTRODES

## (undated) DEVICE — CATH DIAG EXPO M/ PK 6FR FL4/FR4 PIG 3PK

## (undated) DEVICE — GW INQWIRE FC PTFE STD J/1.5 .035 260

## (undated) DEVICE — ST EXT IV SMARTSITE 2VLV SP M LL 5ML IV1

## (undated) DEVICE — CATH DIAG EXPO .056 FL3.5 6F 100CM

## (undated) DEVICE — GLIDESHEATH BASIC HYDROPHILIC COATED INTRODUCER SHEATH: Brand: GLIDESHEATH

## (undated) DEVICE — ST INF PRI SMRTSTE 20DRP 2VLV 24ML 117

## (undated) DEVICE — DEV COMP RAD PRELUDESYNC 24CM

## (undated) DEVICE — GW PERIPH VASC ADX J/TP SS .035 150CM 3MM

## (undated) DEVICE — MODEL AT P65, P/N 701554-001KIT CONTENTS: HAND CONTROLLER, 3-WAY HIGH-PRESSURE STOPCOCK WITH ROTATING END AND PREMIUM HIGH-PRESSURE TUBING: Brand: ANGIOTOUCH® KIT